# Patient Record
Sex: FEMALE | Race: WHITE | NOT HISPANIC OR LATINO | Employment: PART TIME | URBAN - NONMETROPOLITAN AREA
[De-identification: names, ages, dates, MRNs, and addresses within clinical notes are randomized per-mention and may not be internally consistent; named-entity substitution may affect disease eponyms.]

---
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
## 2021-03-18 ENCOUNTER — OFFICE VISIT (OUTPATIENT)
Dept: OBSTETRICS AND GYNECOLOGY | Facility: CLINIC | Age: 21
End: 2021-03-18
Payer: MEDICAID

## 2021-03-18 VITALS
SYSTOLIC BLOOD PRESSURE: 159 MMHG | WEIGHT: 231 LBS | DIASTOLIC BLOOD PRESSURE: 100 MMHG | BODY MASS INDEX: 36.26 KG/M2 | HEIGHT: 67 IN | HEART RATE: 81 BPM

## 2021-03-18 DIAGNOSIS — Z11.3 SCREENING EXAMINATION FOR VENEREAL DISEASE: ICD-10-CM

## 2021-03-18 DIAGNOSIS — R30.0 DYSURIA: Primary | ICD-10-CM

## 2021-03-18 LAB
BILIRUB SERPL-MCNC: ABNORMAL MG/DL
BLOOD URINE, POC: 5
CLARITY, POC UA: CLEAR
COLOR, POC UA: ABNORMAL
GLUCOSE UR QL STRIP: ABNORMAL
KETONES UR QL STRIP: ABNORMAL
LEUKOCYTE ESTERASE URINE, POC: 75
NITRITE, POC UA: ABNORMAL
PH, POC UA: 5.5
PROTEIN, POC: 30
SPECIFIC GRAVITY, POC UA: 1.02
UROBILINOGEN, POC UA: NORMAL

## 2021-03-18 PROCEDURE — 99213 OFFICE O/P EST LOW 20 MIN: CPT | Mod: S$PBB,,, | Performed by: OBSTETRICS & GYNECOLOGY

## 2021-03-18 PROCEDURE — 80074 ACUTE HEPATITIS PANEL: CPT | Performed by: OBSTETRICS & GYNECOLOGY

## 2021-03-18 PROCEDURE — 99999 PR PBB SHADOW E&M-EST. PATIENT-LVL III: ICD-10-PCS | Mod: PBBFAC,,, | Performed by: OBSTETRICS & GYNECOLOGY

## 2021-03-18 PROCEDURE — 87661 TRICHOMONAS VAGINALIS AMPLIF: CPT | Performed by: OBSTETRICS & GYNECOLOGY

## 2021-03-18 PROCEDURE — 86695 HERPES SIMPLEX TYPE 1 TEST: CPT | Performed by: OBSTETRICS & GYNECOLOGY

## 2021-03-18 PROCEDURE — 86696 HERPES SIMPLEX TYPE 2 TEST: CPT | Performed by: OBSTETRICS & GYNECOLOGY

## 2021-03-18 PROCEDURE — 86592 SYPHILIS TEST NON-TREP QUAL: CPT | Performed by: OBSTETRICS & GYNECOLOGY

## 2021-03-18 PROCEDURE — 99213 OFFICE O/P EST LOW 20 MIN: CPT | Mod: PBBFAC | Performed by: OBSTETRICS & GYNECOLOGY

## 2021-03-18 PROCEDURE — 81002 URINALYSIS NONAUTO W/O SCOPE: CPT | Mod: PBBFAC | Performed by: OBSTETRICS & GYNECOLOGY

## 2021-03-18 PROCEDURE — 99213 PR OFFICE/OUTPT VISIT, EST, LEVL III, 20-29 MIN: ICD-10-PCS | Mod: S$PBB,,, | Performed by: OBSTETRICS & GYNECOLOGY

## 2021-03-18 PROCEDURE — 86703 HIV-1/HIV-2 1 RESULT ANTBDY: CPT | Performed by: OBSTETRICS & GYNECOLOGY

## 2021-03-18 PROCEDURE — 99999 PR PBB SHADOW E&M-EST. PATIENT-LVL III: CPT | Mod: PBBFAC,,, | Performed by: OBSTETRICS & GYNECOLOGY

## 2021-03-18 RX ORDER — NITROFURANTOIN 25; 75 MG/1; MG/1
100 CAPSULE ORAL 2 TIMES DAILY
Qty: 14 CAPSULE | Refills: 0 | Status: SHIPPED | OUTPATIENT
Start: 2021-03-18 | End: 2021-03-25

## 2021-03-19 LAB
HAV IGM SERPL QL IA: NEGATIVE
HBV CORE IGM SERPL QL IA: NEGATIVE
HBV SURFACE AG SERPL QL IA: NEGATIVE
HCV AB SERPL QL IA: NEGATIVE
HIV 1+2 AB+HIV1 P24 AG SERPL QL IA: NEGATIVE
RPR SER QL: NORMAL

## 2021-03-23 LAB
HSV1 IGG SERPL QL IA: POSITIVE
HSV2 IGG SERPL QL IA: NEGATIVE

## 2021-03-23 RX ORDER — AZITHROMYCIN 500 MG/1
1000 TABLET, FILM COATED ORAL DAILY
Qty: 2 TABLET | Refills: 0 | Status: SHIPPED | OUTPATIENT
Start: 2021-03-23 | End: 2021-03-24

## 2021-04-01 LAB
CHLAMYDIA/N. GONORROHEAE AMP DNA: NORMAL
TRICHOMONAS VAGINALIS, DNA, URINE: NORMAL

## 2021-04-05 DIAGNOSIS — A74.9 CHLAMYDIA INFECTION: Primary | ICD-10-CM

## 2021-04-05 RX ORDER — AZITHROMYCIN 500 MG/1
1000 TABLET, FILM COATED ORAL ONCE
Qty: 2 TABLET | Refills: 0 | Status: SHIPPED | OUTPATIENT
Start: 2021-04-05 | End: 2021-04-05

## 2021-06-08 ENCOUNTER — HISTORICAL (OUTPATIENT)
Dept: ADMINISTRATIVE | Facility: HOSPITAL | Age: 21
End: 2021-06-08

## 2021-06-08 LAB — SARS-COV-2 RNA RESP QL NAA+PROBE: NEGATIVE

## 2021-09-27 ENCOUNTER — HISTORICAL (OUTPATIENT)
Dept: ADMINISTRATIVE | Facility: HOSPITAL | Age: 21
End: 2021-09-27

## 2021-09-27 LAB
BILIRUB SERPL-MCNC: NEGATIVE MG/DL
BLOOD URINE, POC: NEGATIVE
CLARITY, POC UA: CLEAR
COLOR, POC UA: YELLOW
GLUCOSE UR QL STRIP: NEGATIVE
HAV IGM SERPL QL IA: NONREACTIVE
HBV CORE IGM SERPL QL IA: NONREACTIVE
HBV SURFACE AG SERPL QL IA: NONREACTIVE
HCV AB SERPL QL IA: NONREACTIVE
HIV 1+2 AB+HIV1 P24 AG SERPL QL IA: NONREACTIVE
KETONES UR QL STRIP: NEGATIVE
LEUKOCYTE EST, POC UA: NORMAL
NITRITE, POC UA: NEGATIVE
PH, POC UA: 6.5
PROTEIN, POC: NEGATIVE
SPECIFIC GRAVITY, POC UA: 1.02
T PALLIDUM AB SER QL: NONREACTIVE
UROBILINOGEN, POC UA: NORMAL

## 2021-10-14 ENCOUNTER — HISTORICAL (OUTPATIENT)
Dept: ADMINISTRATIVE | Facility: HOSPITAL | Age: 21
End: 2021-10-14

## 2021-10-14 LAB — SARS-COV-2 RNA RESP QL NAA+PROBE: NEGATIVE

## 2021-11-18 LAB — POC BETA-HCG (QUAL): NEGATIVE

## 2021-12-22 ENCOUNTER — HISTORICAL (OUTPATIENT)
Dept: ADMINISTRATIVE | Facility: HOSPITAL | Age: 21
End: 2021-12-22

## 2021-12-22 LAB
SARS-COV-2 RNA RESP QL NAA+PROBE: POSITIVE
STREP A PCR (OHS): NOT DETECTED

## 2021-12-27 ENCOUNTER — HISTORICAL (OUTPATIENT)
Dept: ADMINISTRATIVE | Facility: HOSPITAL | Age: 21
End: 2021-12-27

## 2022-01-18 ENCOUNTER — OFFICE VISIT (OUTPATIENT)
Dept: OBSTETRICS AND GYNECOLOGY | Facility: CLINIC | Age: 22
End: 2022-01-18
Payer: MEDICAID

## 2022-01-18 VITALS
WEIGHT: 220 LBS | HEIGHT: 67 IN | SYSTOLIC BLOOD PRESSURE: 141 MMHG | BODY MASS INDEX: 34.53 KG/M2 | HEART RATE: 85 BPM | DIASTOLIC BLOOD PRESSURE: 74 MMHG

## 2022-01-18 DIAGNOSIS — Z32.02 PREGNANCY TEST NEGATIVE: ICD-10-CM

## 2022-01-18 DIAGNOSIS — Z30.09 GENERAL COUNSELING FOR PRESCRIPTION OF ORAL CONTRACEPTIVES: ICD-10-CM

## 2022-01-18 DIAGNOSIS — Z30.011 ENCOUNTER FOR INITIAL PRESCRIPTION OF CONTRACEPTIVE PILLS: Primary | ICD-10-CM

## 2022-01-18 PROCEDURE — 3008F PR BODY MASS INDEX (BMI) DOCUMENTED: ICD-10-PCS | Mod: CPTII,,, | Performed by: OBSTETRICS & GYNECOLOGY

## 2022-01-18 PROCEDURE — 3078F DIAST BP <80 MM HG: CPT | Mod: CPTII,,, | Performed by: OBSTETRICS & GYNECOLOGY

## 2022-01-18 PROCEDURE — 3077F PR MOST RECENT SYSTOLIC BLOOD PRESSURE >= 140 MM HG: ICD-10-PCS | Mod: CPTII,,, | Performed by: OBSTETRICS & GYNECOLOGY

## 2022-01-18 PROCEDURE — 81025 URINE PREGNANCY TEST: CPT | Mod: PBBFAC | Performed by: OBSTETRICS & GYNECOLOGY

## 2022-01-18 PROCEDURE — 99999 PR PBB SHADOW E&M-EST. PATIENT-LVL III: CPT | Mod: PBBFAC,,, | Performed by: OBSTETRICS & GYNECOLOGY

## 2022-01-18 PROCEDURE — 1159F PR MEDICATION LIST DOCUMENTED IN MEDICAL RECORD: ICD-10-PCS | Mod: CPTII,,, | Performed by: OBSTETRICS & GYNECOLOGY

## 2022-01-18 PROCEDURE — 1160F PR REVIEW ALL MEDS BY PRESCRIBER/CLIN PHARMACIST DOCUMENTED: ICD-10-PCS | Mod: CPTII,,, | Performed by: OBSTETRICS & GYNECOLOGY

## 2022-01-18 PROCEDURE — 99213 OFFICE O/P EST LOW 20 MIN: CPT | Mod: S$PBB,,, | Performed by: OBSTETRICS & GYNECOLOGY

## 2022-01-18 PROCEDURE — 3078F PR MOST RECENT DIASTOLIC BLOOD PRESSURE < 80 MM HG: ICD-10-PCS | Mod: CPTII,,, | Performed by: OBSTETRICS & GYNECOLOGY

## 2022-01-18 PROCEDURE — 99999 PR PBB SHADOW E&M-EST. PATIENT-LVL III: ICD-10-PCS | Mod: PBBFAC,,, | Performed by: OBSTETRICS & GYNECOLOGY

## 2022-01-18 PROCEDURE — 99213 OFFICE O/P EST LOW 20 MIN: CPT | Mod: PBBFAC | Performed by: OBSTETRICS & GYNECOLOGY

## 2022-01-18 PROCEDURE — 1160F RVW MEDS BY RX/DR IN RCRD: CPT | Mod: CPTII,,, | Performed by: OBSTETRICS & GYNECOLOGY

## 2022-01-18 PROCEDURE — 3077F SYST BP >= 140 MM HG: CPT | Mod: CPTII,,, | Performed by: OBSTETRICS & GYNECOLOGY

## 2022-01-18 PROCEDURE — 3008F BODY MASS INDEX DOCD: CPT | Mod: CPTII,,, | Performed by: OBSTETRICS & GYNECOLOGY

## 2022-01-18 PROCEDURE — 1159F MED LIST DOCD IN RCRD: CPT | Mod: CPTII,,, | Performed by: OBSTETRICS & GYNECOLOGY

## 2022-01-18 PROCEDURE — 99213 PR OFFICE/OUTPT VISIT, EST, LEVL III, 20-29 MIN: ICD-10-PCS | Mod: S$PBB,,, | Performed by: OBSTETRICS & GYNECOLOGY

## 2022-01-18 RX ORDER — METHYLPHENIDATE HYDROCHLORIDE 27 MG/1
27 TABLET ORAL EVERY MORNING
COMMUNITY
Start: 2022-01-06 | End: 2023-08-02

## 2022-01-18 RX ORDER — DROSPIRENONE AND ETHINYL ESTRADIOL 0.02-3(28)
1 KIT ORAL DAILY
Qty: 28 TABLET | Refills: 6 | Status: SHIPPED | OUTPATIENT
Start: 2022-01-18 | End: 2023-01-30

## 2022-01-18 RX ORDER — DROSPIRENONE AND ETHINYL ESTRADIOL 0.02-3(28)
1 KIT ORAL DAILY
Qty: 28 TABLET | Refills: 3 | Status: SHIPPED | OUTPATIENT
Start: 2022-01-18 | End: 2022-01-18 | Stop reason: SDUPTHER

## 2022-01-18 NOTE — PATIENT INSTRUCTIONS
"Patient Education       Choosing Birth Control   The Basics   Written by the doctors and editors at [facility]   What is birth control? -- Birth control is a term used to describe ways to prevent pregnancy. Another word for birth control is "contraception."  Different types of birth control include medicines, devices, and procedures. Some types need to be used every time you have sex. Other types can prevent pregnancy for long periods of time. Some types need a doctor's prescription, and others do not.  Which type of birth control should I choose? -- There are many different types of birth control, and this is a personal decision. Your doctor or nurse can work with you to choose the type that is right for you. To help you make a decision, think about:  · How well it prevents pregnancy - No birth control works 100 percent perfectly all the time, but some prevent pregnancy better than others.  · How often you have to use it - For example, if you choose to take birth control pills, you must take them every day. There are other types, like condoms, that you use only when you have sex.  · How easy it is to get - For some types of birth control, you need to see a doctor or nurse. You can get other types at the drug store, or at a health clinic like [facility].  · How easy it is to use  · Whether it has benefits besides preventing pregnancy - For example, some types of birth control help make your periods lighter or more regular, or reduce period cramps.  · Its side effects or downsides  · How much it costs  · If you think you might want to get pregnant in the future - Some types of birth control are permanent, meaning they prevent you from ever getting pregnant. Other types of birth control prevent pregnancy only for a limited amount of time. After that time, you can still get pregnant.  · How soon you might want to get pregnant in the future - Some types of birth control can be started and stopped quickly. Other " "types can prevent pregnancy for several years.   · Whether it protects you from infection - Condoms are the only form of birth control that can reduce your chance of getting certain sexually transmitted infections ("STIs").  What are the different types of birth control and how do they work? -- Different types of birth control prevent pregnancy in different ways (table 1). Some work better than others. But they are different in other ways, too.  The main types of birth control include:  · Permanent procedures - These make a person unable to get pregnant, or get a partner pregnant. They include tubal ligation (having your "tubes tied") and vasectomy.  · Long-acting methods - These are forms of birth control that can give you protection for years at a time. They include the IUDs and the implant.  · Hormonal birth control - These methods use hormones to prevent pregnancy. They include pills, injections, patches, and vaginal rings.  · Condoms - These are also called a "barrier" method. They prevent sperm from getting into the uterus and reaching an egg.  · "Pericoital" methods - This refers to birth control you use at the time of sex, such as diaphragms, sponges, and spermicides. Condoms are also a type of pericoital birth control.  What about natural forms of birth control? -- There are a few forms of "natural" birth control, which require no medicines or devices. They include:  · Withdrawal - This is when the male partner pulls out before ejaculating.  · Fertility awareness - This involves keeping track of your menstrual cycle so you can predict when you are most likely to get pregnant each month. Then, you can avoid sex during that time, or use some form of birth control then, such as condoms.  · Breastfeeding - Breastfeeding can decrease a person's ability to get pregnant. Some people use it as a form of birth control for the first few weeks after having a baby. But for it to work, breast milk should be the baby's " "only food. The medical term for using breastfeeding as birth control is "lactational amenorrhea method," or "TADEO." If you want to try this method, discuss it with your doctor or nurse.  These forms of birth control are less reliable than other methods. If you feel strongly that you do not want to get pregnant, or get your partner pregnant, you might want to consider other methods instead.  What if I have problems with my birth control? -- Let your doctor or nurse know if you have any side effects or problems with your birth control. Sometimes, side effects will go away after a few months. If they don't, you might want to switch to a different type. Your doctor or nurse can talk with you about your options.  All topics are updated as new evidence becomes available and our peer review process is complete.  This topic retrieved from [employer] on: Sep 21, 2021.  Topic 26981 Version 13.0  Release: 29.4.2 - C29.263  © 2021 UpToDate, Inc. and/or its affiliates. All rights reserved.  table 1: Types of birth control  Type  Methods included  Some information    Pericoital methods · Diaphragm   · Cervical cap   · Sponge   · Spermicides  "Pericoital" means methods that are used every time you have sex. The diaphragm, cervical cap, and sponge are used along with spermicide. Spermicide is a creams or gel that kills sperm before it can get to an egg. It can also be used alone, but is not as effective this way.   Barrier methods · Condoms (external and internal) Barrier methods block sperm from getting into the uterus and reaching an egg. Condoms are the only form of birth control that can also protect against infections you can get through sex.   Short-acting hormonal methods · Shot/injection   · Progestin-only pill   · Estrogen-progestin pill   · Patch   · Vaginal ring  These methods all use hormones to cause changes in the body that reduce the chance of pregnancy. The different options require different amounts of attention. For " "example, if you get the shot, you must see your doctor every 3 months. If you take pills, you must take a pill every day. If you use the patch or the ring, you must change it once a week.   Long-acting methods · Implantable carlos   · Intrauterine device (IUD) with progestin   · IUD with copper  The implantable carlos and the IUD with progestin both use hormones to prevent pregnancy. The IUD with copper releases copper to prevent pregnancy. These stay in the body and keep working for 3 to 10 years, depending on the type chosen.   Permanent methods · Vasectomy   · Tubal ligation (having your "tubes tied") These methods involve procedures or surgery and are permanent.   Graphic 52808 Version 5.0  Consumer Information Use and Disclaimer   This information is not specific medical advice and does not replace information you receive from your health care provider. This is only a brief summary of general information. It does NOT include all information about conditions, illnesses, injuries, tests, procedures, treatments, therapies, discharge instructions or life-style choices that may apply to you. You must talk with your health care provider for complete information about your health and treatment options. This information should not be used to decide whether or not to accept your health care provider's advice, instructions or recommendations. Only your health care provider has the knowledge and training to provide advice that is right for you. The use of this information is governed by the [employer] End User License Agreement, available at https://www.[employer]/en/solutions/[employer]angle/about/cate.The use of [employer] content is governed by the [employer] Terms of Use. ©2021 UpToDate, Inc. All rights reserved.  Copyright   © 2021 UpToDate, Inc. and/or its affiliates. All rights reserved.    "

## 2022-01-18 NOTE — PROGRESS NOTES
Subjective:    Patient ID: Maggi Hubbard is a 21 y.o. y.o. female    Chief Complaint:   Chief Complaint   Patient presents with    Contraception       History of Present Illness:  Maggi presents today to restart birth control pills.  Her last cycle was about 1 month ago.  She states her cycles have been regular.  She desires to resume birth control pills.  She stopped them previously due to having trouble remembering to take them every day.  She says that she feels she can do better now.  She is interested in resuming the same pills she took previously as she tolerated them well.    Review of Systems   Constitutional: Negative for chills, fatigue and fever.   Respiratory: Negative for shortness of breath.    Cardiovascular: Negative for chest pain.   Gastrointestinal: Negative for abdominal pain, constipation, diarrhea and nausea.   Genitourinary: Negative for bladder incontinence, dysuria, hot flashes, pelvic pain and vaginal bleeding.   Neurological: Negative for headaches.   Psychiatric/Behavioral: Negative for depression.         Objective:    Vital Signs:  Vitals:    01/18/22 1622   BP: (!) 141/74   Pulse: 85     Wt Readings from Last 1 Encounters:   01/18/22 99.8 kg (220 lb)     Body mass index is 34.46 kg/m².    Physical Exam:  General:  alert, no distress   Abdomen:  Soft, nontender   Extremities: No cyanosis, clubbing, edema     Use and side effects of oral contraceptives discussed and patient desires to proceed.  All questions answered    I spent a total of 20 minutes on the day of the visit.  This includes face to face time and non-face to face time preparing to see the patient (eg, review of tests), obtaining and/or reviewing separately obtained history, documenting clinical information in the electronic or other health record, independently interpreting results and communicating results to the patient/family/caregiver, or care coordinator.          Assessment:      1. Encounter for initial prescription  of contraceptive pills    2. Pregnancy test negative    3. General counseling for prescription of oral contraceptives          Plan:      Encounter for initial prescription of contraceptive pills  -     POCT urine pregnancy  -     Discontinue: drospirenone-ethinyl estradioL (GIANVI, 28,) 3-0.02 mg per tablet; Take 1 tablet by mouth once daily.  Dispense: 28 tablet; Refill: 3  -     drospirenone-ethinyl estradioL (GIANVI, 28,) 3-0.02 mg per tablet; Take 1 tablet by mouth once daily.  Dispense: 28 tablet; Refill: 6    Pregnancy test negative    General counseling for prescription of oral contraceptives    rtc 6 months for follow up       Ness Yanes MD, FACOG   01/18/2022 4:33 PM

## 2022-04-11 ENCOUNTER — HISTORICAL (OUTPATIENT)
Dept: ADMINISTRATIVE | Facility: HOSPITAL | Age: 22
End: 2022-04-11
Payer: MEDICAID

## 2022-04-28 VITALS
DIASTOLIC BLOOD PRESSURE: 75 MMHG | BODY MASS INDEX: 32.76 KG/M2 | HEIGHT: 67 IN | WEIGHT: 208.75 LBS | OXYGEN SATURATION: 98 % | SYSTOLIC BLOOD PRESSURE: 128 MMHG

## 2022-09-22 ENCOUNTER — HISTORICAL (OUTPATIENT)
Dept: ADMINISTRATIVE | Facility: HOSPITAL | Age: 22
End: 2022-09-22
Payer: MEDICAID

## 2022-09-24 ENCOUNTER — HOSPITAL ENCOUNTER (EMERGENCY)
Facility: HOSPITAL | Age: 22
Discharge: HOME | End: 2022-09-24
Attending: STUDENT IN AN ORGANIZED HEALTH CARE EDUCATION/TRAINING PROGRAM
Payer: MEDICAID

## 2022-09-24 VITALS
WEIGHT: 233.69 LBS | RESPIRATION RATE: 18 BRPM | HEART RATE: 100 BPM | HEIGHT: 67 IN | BODY MASS INDEX: 36.68 KG/M2 | DIASTOLIC BLOOD PRESSURE: 84 MMHG | SYSTOLIC BLOOD PRESSURE: 148 MMHG | OXYGEN SATURATION: 99 % | TEMPERATURE: 98 F

## 2022-09-24 DIAGNOSIS — S02.5XXA CLOSED FRACTURE OF TOOTH, INITIAL ENCOUNTER: ICD-10-CM

## 2022-09-24 DIAGNOSIS — S00.81XA ABRASION OF FACE, INITIAL ENCOUNTER: ICD-10-CM

## 2022-09-24 DIAGNOSIS — S09.93XA FACIAL INJURY, INITIAL ENCOUNTER: Primary | ICD-10-CM

## 2022-09-24 LAB
B-HCG UR QL: NEGATIVE
CTP QC/QA: YES

## 2022-09-24 PROCEDURE — 99285 EMERGENCY DEPT VISIT HI MDM: CPT | Mod: 25

## 2022-09-24 PROCEDURE — 96372 THER/PROPH/DIAG INJ SC/IM: CPT | Performed by: PHYSICIAN ASSISTANT

## 2022-09-24 PROCEDURE — 25000003 PHARM REV CODE 250: Performed by: PHYSICIAN ASSISTANT

## 2022-09-24 PROCEDURE — 63600175 PHARM REV CODE 636 W HCPCS: Performed by: PHYSICIAN ASSISTANT

## 2022-09-24 PROCEDURE — 90471 IMMUNIZATION ADMIN: CPT | Performed by: PHYSICIAN ASSISTANT

## 2022-09-24 PROCEDURE — 81025 URINE PREGNANCY TEST: CPT | Performed by: PHYSICIAN ASSISTANT

## 2022-09-24 PROCEDURE — 90715 TDAP VACCINE 7 YRS/> IM: CPT | Performed by: PHYSICIAN ASSISTANT

## 2022-09-24 RX ORDER — AMOXICILLIN AND CLAVULANATE POTASSIUM 875; 125 MG/1; MG/1
1 TABLET, FILM COATED ORAL EVERY 12 HOURS
Qty: 14 TABLET | Refills: 0 | Status: SHIPPED | OUTPATIENT
Start: 2022-09-24 | End: 2022-10-01

## 2022-09-24 RX ORDER — ONDANSETRON 4 MG/1
4 TABLET, ORALLY DISINTEGRATING ORAL
Status: DISCONTINUED | OUTPATIENT
Start: 2022-09-24 | End: 2022-09-24 | Stop reason: HOSPADM

## 2022-09-24 RX ORDER — KETOROLAC TROMETHAMINE 30 MG/ML
30 INJECTION, SOLUTION INTRAMUSCULAR; INTRAVENOUS
Status: COMPLETED | OUTPATIENT
Start: 2022-09-24 | End: 2022-09-24

## 2022-09-24 RX ORDER — HYDROCODONE BITARTRATE AND ACETAMINOPHEN 5; 325 MG/1; MG/1
1 TABLET ORAL EVERY 6 HOURS PRN
Qty: 12 TABLET | Refills: 0 | Status: SHIPPED | OUTPATIENT
Start: 2022-09-24 | End: 2023-07-28

## 2022-09-24 RX ADMIN — KETOROLAC TROMETHAMINE 30 MG: 30 INJECTION, SOLUTION INTRAMUSCULAR; INTRAVENOUS at 09:09

## 2022-09-24 RX ADMIN — TETANUS TOXOID, REDUCED DIPHTHERIA TOXOID AND ACELLULAR PERTUSSIS VACCINE, ADSORBED 0.5 ML: 5; 2.5; 8; 8; 2.5 SUSPENSION INTRAMUSCULAR at 06:09

## 2022-09-24 RX ADMIN — BACITRACIN ZINC, NEOMYCIN, POLYMYXIN B 1 EACH: 400; 3.5; 5 OINTMENT TOPICAL at 06:09

## 2022-09-24 NOTE — ED PROVIDER NOTES
Encounter Date: 9/24/2022       History     Chief Complaint   Patient presents with    Facial Injury     Fell forward and struck face on concrete floor last night. Denies LOC. Alcohol involved. Abrasion to nasal bridge; broken tooth causing pain; swollen lip.     Patient with pmhx of ADHD presents today c/o of generalized headache, facial pain, and neck pain since fall while intoxicated last night. Patient accidentally fell forward while walking and landed flat on her face. She was walking on concrete. Since then she has been having the above mentioned symptoms. She reports one episode of emesis last night and says she has been nauseated all day which she has attributed to drinking. She has also chipped her tooth and would like a referral to a dentist. Last tdap unknown. Denies LOC, use of anticoagulation, other associated injuries.     The history is provided by the patient. No  was used.   Review of patient's allergies indicates:  No Known Allergies  History reviewed. No pertinent past medical history.  History reviewed. No pertinent surgical history.  Family History   Family history unknown: Yes     Social History     Tobacco Use    Smoking status: Never    Smokeless tobacco: Never   Substance Use Topics    Alcohol use: Not Currently    Drug use: Never     Review of Systems   Constitutional:  Negative for chills and fever.   HENT:  Positive for dental problem.    Respiratory:  Negative for cough, chest tightness and shortness of breath.    Cardiovascular:  Negative for chest pain, palpitations and leg swelling.   Gastrointestinal:  Positive for nausea and vomiting. Negative for abdominal pain, constipation and diarrhea.   Genitourinary:  Negative for dysuria, flank pain and hematuria.   Musculoskeletal:  Negative for arthralgias and myalgias.   Skin:  Positive for wound.   Neurological:  Positive for headaches. Negative for dizziness, seizures, syncope, facial asymmetry and light-headedness.      Physical Exam     Initial Vitals [09/24/22 1638]   BP Pulse Resp Temp SpO2   (!) 152/89 100 16 98.2 °F (36.8 °C) 97 %      MAP       --         Physical Exam    Vitals reviewed.  Constitutional: She appears well-developed and well-nourished. She is not diaphoretic. No distress.   HENT:   Head: Normocephalic and atraumatic.   Mouth/Throat: Oropharynx is clear and moist. No oropharyngeal exudate.   Superficial abrasion noted to bridge of nose as well as philtrum. There is some ecchymosis surrounding the abrasion on the nose. No epistaxis. No obvious nasal deformity. Tooth #8 with fracture and exposed dentin. Tender to palpation. No periorbital ecchymosis. No gama sign.    Eyes: Conjunctivae and EOM are normal. Pupils are equal, round, and reactive to light. No scleral icterus.   Neck: Neck supple.   No cervical vertebral point tenderness. No step offs. Full AROM of cspine, but increased pain with movement.    Normal range of motion.  Cardiovascular:  Normal rate, regular rhythm, normal heart sounds and intact distal pulses.           Pulmonary/Chest: Breath sounds normal. No respiratory distress. She exhibits no tenderness.   Abdominal: Abdomen is soft. Bowel sounds are normal. She exhibits no distension. There is no abdominal tenderness. There is no rebound and no guarding.   Musculoskeletal:         General: No tenderness or edema. Normal range of motion.      Cervical back: Normal range of motion and neck supple.     Neurological: She is alert and oriented to person, place, and time. She has normal strength. No cranial nerve deficit or sensory deficit. GCS score is 15. GCS eye subscore is 4. GCS verbal subscore is 5. GCS motor subscore is 6.   Skin: Skin is warm and dry. Capillary refill takes less than 2 seconds. No rash noted.   Psychiatric: She has a normal mood and affect.       ED Course   Procedures  Labs Reviewed   POCT URINE PREGNANCY          Imaging Results              CT Head Without Contrast  (Final result)  Result time 09/25/22 09:42:15      Final result by Dagmar Cortes MD (09/25/22 09:42:15)                   Impression:    Impression:    1. No acute intracranial traumatic injury identified. Details and other findings as noted above.    I concur with the preliminary report      Electronically signed by: Dagmar Cortes  Date:    09/25/2022  Time:    09:42               Narrative:    EXAMINATION:  CT HEAD WITHOUT CONTRAST    Technique: CT of the head was performed without intravenous contrast with axial as well as coronal and sagittal images.    Comparison: None.    Dosage Information: Automated Exposure Control was utilized 920.38 mGy.cm.    Clinical history: Pt states she fell last night and hit her face; scrape to nose area.    Findings:    Hemorrhage: No acute intracranial hemorrhage is seen.    CSF spaces: The ventricles sulci and basal cisterns are within normal limits.    Brain parenchyma: Unremarkable with preservation of the grey white junction throughout.    Cerebellum: Unremarkable.    Vascular: Unremarkable.    Sella and skull base: The sella appears to be within normal limits for age.    Cerebellopontine angles: Within normal limits.    Herniation: None.    Intracranial calcifications: Incidental note is made of bilateral choroid plexus calcification. Incidental note is made of some pineal region calcification.    Calvarium: No acute linear or depressed skull fracture is seen.    Maxillofacial Structures: Maxillofacial findings discussed separately in the maxillofacial CT report.                        Preliminary result by Dagmar Cortes MD (09/24/22 19:35:41)                   Narrative:    START OF REPORT:  Technique: CT of the head was performed without intravenous contrast with axial as well as coronal and sagittal images.    Comparison: None.    Dosage Information: Automated Exposure Control was utilized 920.38 mGy.cm.    Clinical history: Pt states she fell last  night and hit her face; scrape to nose area.    Findings:  Hemorrhage: No acute intracranial hemorrhage is seen.  CSF spaces: The ventricles sulci and basal cisterns are within normal limits.  Brain parenchyma: Unremarkable with preservation of the grey white junction throughout.  Cerebellum: Unremarkable.  Vascular: Unremarkable.  Sella and skull base: The sella appears to be within normal limits for age.  Cerebellopontine angles: Within normal limits.  Herniation: None.  Intracranial calcifications: Incidental note is made of bilateral choroid plexus calcification. Incidental note is made of some pineal region calcification.  Calvarium: No acute linear or depressed skull fracture is seen.    Maxillofacial Structures: Maxillofacial findings discussed separately in the maxillofacial CT report.      Impression:  1. No acute intracranial traumatic injury identified. Details and other findings as noted above.                          Preliminary result by Donald Hogan MD (09/24/22 19:35:41)                   Narrative:    START OF REPORT:  Technique: CT of the head was performed without intravenous contrast with axial as well as coronal and sagittal images.    Comparison: None.    Dosage Information: Automated Exposure Control was utilized 920.38 mGy.cm.    Clinical history: Pt states she fell last night and hit her face; scrape to nose area.    Findings:  Hemorrhage: No acute intracranial hemorrhage is seen.  CSF spaces: The ventricles sulci and basal cisterns are within normal limits.  Brain parenchyma: Unremarkable with preservation of the grey white junction throughout.  Cerebellum: Unremarkable.  Vascular: Unremarkable.  Sella and skull base: The sella appears to be within normal limits for age.  Cerebellopontine angles: Within normal limits.  Herniation: None.  Intracranial calcifications: Incidental note is made of bilateral choroid plexus calcification. Incidental note is made of some pineal region  calcification.  Calvarium: No acute linear or depressed skull fracture is seen.    Maxillofacial Structures: Maxillofacial findings discussed separately in the maxillofacial CT report.      Impression:  1. No acute intracranial traumatic injury identified. Details and other findings as noted above.                                         CT Maxillofacial Without Contrast (Final result)  Result time 09/25/22 10:17:07      Final result by Dagmar Cortes MD (09/25/22 10:17:07)                   Impression:    Impression:    1. No acute maxillofacial fracture identified. Details and other findings as noted above.    I concur with the preliminary report      Electronically signed by: Dagmar Cortes  Date:    09/25/2022  Time:    10:17               Narrative:    EXAMINATION:  CT MAXILLOFACIAL WITHOUT CONTRAST    Technique: Maxillofacial CT was performed with intravenous contrast with axial as well as sagittal and coronal images.  Automatic exposure control (AEC) is utilized to reduce patient radiation exposure.    Comparison: None.    Clinical history: Pt states she fell last night and hit her face; scrape to nose area.    Findings:    Orbital soft tissues: The orbital soft tissues appear unremarkable.    Bones:    Orbital bony structures: The bilateral orbital bony structures are intact with no orbital fracture identified.    Mandible: The mandible appears unremarkable.    Maxilla: The maxilla appears unremarkable.    Zygoma: The zygomatic arches are intact.    TMJ: The mandibular condyles appear normally placed with respect to the mandibular fossa.    Nasal Bones: No displaced nasal bone fracture is seen.    Paranasal sinuses: Minimal mucosal densities are seen in the left maxillary sinus. This may reflect mild sinusitis. The rest of the paranasal sinuses appear clear.    Mastoid air cells: The visualized mastoid air cells appear clear.                        Preliminary result by Dagmar Cortes MD  (09/24/22 19:35:02)                   Narrative:    START OF REPORT:  Technique: Maxillofacial CT was performed with intravenous contrast with axial as well as sagittal and coronal images.    Comparison: None.    Clinical history: Pt states she fell last night and hit her face; scrape to nose area.    Findings:  Orbital soft tissues: The orbital soft tissues appear unremarkable.  Bones:  Orbital bony structures: The bilateral orbital bony structures are intact with no orbital fracture identified.  Mandible: The mandible appears unremarkable.  Maxilla: The maxilla appears unremarkable.  Zygoma: The zygomatic arches are intact.  TMJ: The mandibular condyles appear normally placed with respect to the mandibular fossa.  Nasal Bones: No displaced nasal bone fracture is seen.  Paranasal sinuses: Minimal mucosal densities are seen in the left maxillary sinus. This may reflect mild sinusitis. The rest of the paranasal sinuses appear clear.  Mastoid air cells: The visualized mastoid air cells appear clear.      Impression:  1. No acute maxillofacial fracture identified. Details and other findings as noted above.                          Preliminary result by Dagmar Cortes MD (09/24/22 19:35:02)                   Narrative:    START OF REPORT:  Technique: Maxillofacial CT was performed with intravenous contrast with axial as well as sagittal and coronal images.    Comparison: None.    Clinical history: Pt states she fell last night and hit her face; scrape to nose area.    Findings:  Orbital soft tissues: The orbital soft tissues appear unremarkable.  Bones:  Orbital bony structures: The bilateral orbital bony structures are intact with no orbital fracture identified.  Mandible: The mandible appears unremarkable.  Maxilla: The maxilla appears unremarkable.  Zygoma: The zygomatic arches are intact.  TMJ: The mandibular condyles appear normally placed with respect to the mandibular fossa.  Nasal Bones: No displaced  nasal bone fracture is seen.  Paranasal sinuses: Minimal mucosal densities are seen in the left maxillary sinus. This may reflect mild sinusitis. The rest of the paranasal sinuses appear clear.  Mastoid air cells: The visualized mastoid air cells appear clear.      Impression:  1. No acute maxillofacial fracture identified. Details and other findings as noted above.                                         CT Cervical Spine Without Contrast (Final result)  Result time 09/25/22 09:26:19      Final result by Dagmar Cortes MD (09/25/22 09:26:19)                   Impression:    Impression:    1. No acute cervical spine, fracture dislocation or subluxation is seen.    2. Details as noted above.      Electronically signed by: Dagmar Cortes  Date:    09/25/2022  Time:    09:26               Narrative:    EXAMINATION:  CT CERVICAL SPINE WITHOUT CONTRAST    Technique: CT of the cervical spine was performed without intravenous contrast with axial as well as sagittal and coronal images.    Comparison: None.    Dosage Information: Automated Exposure Control was utilized 443.92 mGy.cm.    Clinical history: Pt states she fell last night and hit her face; scrape to nose area.    Findings:    Lung apices: The visualized lung apices appear unremarkable.    Spine:    Spinal canal: The spinal canal appears unremarkable.    Mineralization: Within normal limits.    Rotation: No significant rotation is seen.    Scoliosis: No significant scoliosis is seen.    Listhesis: No significant listhesis is identified.    Lordosis: The cervical lordosis is maintained.    Intervertebral disc spaces: The intervertebral discs are preserved throughout.    Fractures: No acute cervical spine, fracture dislocation or subluxation is seen.    Miscellaneous:    Mastoid air cells: The visualized mastoid air cells appear clear.    Soft Tissues: Unremarkable.                        Preliminary result by Dagmar Cortes MD (09/24/22  19:33:15)                   Narrative:    START OF REPORT:  Technique: CT of the cervical spine was performed without intravenous contrast with axial as well as sagittal and coronal images.    Comparison: None.    Dosage Information: Automated Exposure Control was utilized 443.92 mGy.cm.    Clinical history: Pt states she fell last night and hit her face; scrape to nose area.    Findings:  Lung apices: The visualized lung apices appear unremarkable.  Spine:  Spinal canal: The spinal canal appears unremarkable.  Mineralization: Within normal limits.  Rotation: No significant rotation is seen.  Scoliosis: No significant scoliosis is seen.  Listhesis: No significant listhesis is identified.  Lordosis: The cervical lordosis is maintained.  Intervertebral disc spaces: The intervertebral discs are preserved throughout.  Fractures: No acute cervical spine, fracture dislocation or subluxation is seen.    Miscellaneous:  Mastoid air cells: The visualized mastoid air cells appear clear.  Soft Tissues: Unremarkable.      Impression:  1. No acute cervical spine, fracture dislocation or subluxation is seen.  2. Details as noted above.                          Preliminary result by Donald Hogan MD (09/24/22 19:33:15)                   Narrative:    START OF REPORT:  Technique: CT of the cervical spine was performed without intravenous contrast with axial as well as sagittal and coronal images.    Comparison: None.    Dosage Information: Automated Exposure Control was utilized 443.92 mGy.cm.    Clinical history: Pt states she fell last night and hit her face; scrape to nose area.    Findings:  Lung apices: The visualized lung apices appear unremarkable.  Spine:  Spinal canal: The spinal canal appears unremarkable.  Mineralization: Within normal limits.  Rotation: No significant rotation is seen.  Scoliosis: No significant scoliosis is seen.  Listhesis: No significant listhesis is identified.  Lordosis: The cervical lordosis is  maintained.  Intervertebral disc spaces: The intervertebral discs are preserved throughout.  Fractures: No acute cervical spine, fracture dislocation or subluxation is seen.    Miscellaneous:  Mastoid air cells: The visualized mastoid air cells appear clear.  Soft Tissues: Unremarkable.      Impression:  1. No acute cervical spine, fracture dislocation or subluxation is seen.  2. Details as noted above.                                         Medications   Tdap (BOOSTRIX) vaccine injection 0.5 mL (0.5 mLs Intramuscular Given 9/24/22 1851)   neomycin-bacitracnZn-polymyxnB packet (1 each Topical (Top) Given 9/24/22 1853)   ketorolac injection 30 mg (30 mg Intramuscular Given 9/24/22 2116)           APC / Resident Notes:   I was not physically present during the history, exam or disposition of this patient. I was available at all times for consultation. (Zmora)                   Clinical Impression:   Final diagnoses:  [S09.93XA] Facial injury, initial encounter (Primary)  [S02.5XXA] Closed fracture of tooth, initial encounter  [S00.81XA] Abrasion of face, initial encounter      ED Disposition Condition    Discharge Stable          ED Prescriptions       Medication Sig Dispense Start Date End Date Auth. Provider    amoxicillin-clavulanate 875-125mg (AUGMENTIN) 875-125 mg per tablet Take 1 tablet by mouth every 12 (twelve) hours. for 7 days 14 tablet 9/24/2022 10/1/2022 ROCK Frazier    HYDROcodone-acetaminophen (NORCO) 5-325 mg per tablet Take 1 tablet by mouth every 6 (six) hours as needed for Pain. 12 tablet 9/24/2022 -- ROCK Frazier          Follow-up Information       Follow up With Specialties Details Why Contact Info    [facility] Emergency [facility]  If symptoms worsen return to ED immediately [address] [place] [address] [phone]    Primary Care Provider within 1-2 days  Go in 1 day      Dentist of choice within 1-2 days  Schedule an  appointment as soon as possible for a visit                ROCK Frazier  09/24/22 2015       Phuc Padron MD  09/25/22 1185

## 2022-09-24 NOTE — Clinical Note
"Maggi "Florinda Hubbard was seen and treated in our emergency department on 9/24/2022.  She may return to school on 09/27/2022.      If you have any questions or concerns, please don't hesitate to call.      ZAHEER CANO"

## 2022-09-24 NOTE — Clinical Note
"Maggi "Florinda Hubbard was seen and treated in our emergency department on 9/24/2022.  She may return to school on 09/20/2022.      If you have any questions or concerns, please don't hesitate to call.      ZAHEER CANO"

## 2022-11-07 ENCOUNTER — OFFICE VISIT (OUTPATIENT)
Dept: URGENT CARE | Facility: CLINIC | Age: 22
End: 2022-11-07
Payer: MEDICAID

## 2022-11-07 VITALS
RESPIRATION RATE: 20 BRPM | SYSTOLIC BLOOD PRESSURE: 111 MMHG | BODY MASS INDEX: 37.51 KG/M2 | DIASTOLIC BLOOD PRESSURE: 74 MMHG | HEART RATE: 67 BPM | TEMPERATURE: 99 F | OXYGEN SATURATION: 100 % | WEIGHT: 239 LBS | HEIGHT: 67 IN

## 2022-11-07 DIAGNOSIS — J06.9 ACUTE URI: Primary | ICD-10-CM

## 2022-11-07 PROBLEM — E66.9 OBESITY: Status: ACTIVE | Noted: 2022-11-07

## 2022-11-07 LAB
FLUAV AG UPPER RESP QL IA.RAPID: NOT DETECTED
FLUBV AG UPPER RESP QL IA.RAPID: NOT DETECTED
SARS-COV-2 RNA RESP QL NAA+PROBE: NOT DETECTED
STREP A PCR (OHS): NOT DETECTED

## 2022-11-07 PROCEDURE — 99214 OFFICE O/P EST MOD 30 MIN: CPT | Mod: PBBFAC | Performed by: NURSE PRACTITIONER

## 2022-11-07 PROCEDURE — 0241U COVID/FLU A&B PCR: CPT | Performed by: NURSE PRACTITIONER

## 2022-11-07 PROCEDURE — 87651 STREP A DNA AMP PROBE: CPT | Performed by: NURSE PRACTITIONER

## 2022-11-07 PROCEDURE — 99213 OFFICE O/P EST LOW 20 MIN: CPT | Mod: S$PBB,,, | Performed by: NURSE PRACTITIONER

## 2022-11-07 PROCEDURE — 99213 PR OFFICE/OUTPT VISIT, EST, LEVL III, 20-29 MIN: ICD-10-PCS | Mod: S$PBB,,, | Performed by: NURSE PRACTITIONER

## 2022-11-07 RX ORDER — PROMETHAZINE HYDROCHLORIDE AND DEXTROMETHORPHAN HYDROBROMIDE 6.25; 15 MG/5ML; MG/5ML
5 SYRUP ORAL EVERY 6 HOURS PRN
Qty: 100 ML | Refills: 0 | Status: SHIPPED | OUTPATIENT
Start: 2022-11-07 | End: 2022-11-12

## 2022-11-07 RX ORDER — METHYLPHENIDATE HYDROCHLORIDE 36 MG/1
45 TABLET ORAL EVERY MORNING
COMMUNITY
Start: 2022-09-19

## 2022-11-07 RX ORDER — BENZONATATE 200 MG/1
200 CAPSULE ORAL 3 TIMES DAILY PRN
Qty: 30 CAPSULE | Refills: 0 | Status: SHIPPED | OUTPATIENT
Start: 2022-11-07 | End: 2022-11-17

## 2022-11-08 NOTE — PATIENT INSTRUCTIONS
Please read attached patient education for more information and guidance.     You have 4 tests pending in the hospital lab:  PCR COVID, FLU A/B  PCR Strep    All results will be available today.  Results will post to your [facility] over the next 1-2 days. Please check  your javy frequently for results and messages.   Nurse calls from our clinic can take 1-2 weeks even with abnormal results.   If something is urgent, we will call you today.     Stay home as long as you are symptomatic.    You can BECOME COVID+ up to 14 days after your last exposure to someone who has COVID. If you go in public and around other people, wear a surgical mask, not a cloth.     Cover your mouth with your shirt or inner elbow when you cough or sneeze.    OTC meds for symptoms, pain, fever, as desired, as directed on package labeling.

## 2022-11-08 NOTE — PROGRESS NOTES
"Subjective:       Patient ID: Maggi Hubbard is a 22 y.o. female.    Vitals:  height is 5' 7" (1.702 m) and weight is 108.4 kg (239 lb). Her oral temperature is 98.6 °F (37 °C). Her blood pressure is 111/74 and her pulse is 67. Her respiration is 20 and oxygen saturation is 100%.     Chief Complaint: Cough (Sore throat, cough, congestion, chills)    HPI as stated in CC. Migraine onset last Sunday (8 days ago) and other symptoms progressed throughout the week. Today worst symptom is throat pain.  ROS    Objective:      Physical Exam   Constitutional: She is oriented to person, place, and time. She appears ill. obesity  HENT:   Nose: Rhinorrhea and congestion present.   Mouth/Throat: Posterior oropharyngeal erythema present. No oropharyngeal exudate.   Eyes: Conjunctivae are normal.      Comments: Watery eyes/clear tearing   Neck:      Comments: SM nodes   Cardiovascular: Normal rate, regular rhythm and normal heart sounds.   Pulmonary/Chest: Effort normal and breath sounds normal.   Abdominal: Normal appearance.   Musculoskeletal:      Cervical back: She exhibits no tenderness.   Lymphadenopathy:     She has cervical adenopathy.   Neurological: She is alert and oriented to person, place, and time.   Skin: Skin is warm and dry.   Psychiatric: Her behavior is normal. Mood normal.   Vitals reviewed.      Assessment:       1. Acute URI            Plan:         Acute URI  -     COVID/FLU A&B PCR  -     Strep Group A by PCR    Other orders  -     benzonatate (TESSALON) 200 MG capsule; Take 1 capsule (200 mg total) by mouth 3 (three) times daily as needed.  Dispense: 30 capsule; Refill: 0  -     promethazine-dextromethorphan (PROMETHAZINE-DM) 6.25-15 mg/5 mL Syrp; Take 5 mLs by mouth every 6 (six) hours as needed (severe cough; reserve for evening/night use, will cause drowsiness. do not take before driving, working, drinking alcohol.).  Dispense: 100 mL; Refill: 0  Please read attached patient education for more information " and guidance.     You have 4 tests pending in the hospital lab:  PCR COVID, FLU A/B  PCR Strep    All results will be available today.  Results will post to your [facility] over the next 1-2 days. Please check  your javy frequently for results and messages.   Nurse calls from our clinic can take 1-2 weeks even with abnormal results.   If something is urgent, we will call you today.     Stay home as long as you are symptomatic.    You can BECOME COVID+ up to 14 days after your last exposure to someone who has COVID. If you go in public and around other people, wear a surgical mask, not a cloth.     Cover your mouth with your shirt or inner elbow when you cough or sneeze.    OTC meds for symptoms, pain, fever, as desired, as directed on package labeling.

## 2023-01-24 ENCOUNTER — OFFICE VISIT (OUTPATIENT)
Dept: URGENT CARE | Facility: CLINIC | Age: 23
End: 2023-01-24
Payer: MEDICAID

## 2023-01-24 VITALS
DIASTOLIC BLOOD PRESSURE: 78 MMHG | HEIGHT: 67 IN | WEIGHT: 223.81 LBS | RESPIRATION RATE: 18 BRPM | OXYGEN SATURATION: 98 % | TEMPERATURE: 98 F | BODY MASS INDEX: 35.13 KG/M2 | SYSTOLIC BLOOD PRESSURE: 113 MMHG | HEART RATE: 62 BPM

## 2023-01-24 DIAGNOSIS — N30.90 CYSTITIS: ICD-10-CM

## 2023-01-24 DIAGNOSIS — R30.0 DYSURIA: Primary | ICD-10-CM

## 2023-01-24 DIAGNOSIS — R35.0 URINARY FREQUENCY: ICD-10-CM

## 2023-01-24 LAB
BILIRUB UR QL STRIP: POSITIVE
GLUCOSE UR QL STRIP: NEGATIVE
KETONES UR QL STRIP: NEGATIVE
LEUKOCYTE ESTERASE UR QL STRIP: POSITIVE
PH, POC UA: 6
POC BLOOD, URINE: POSITIVE
POC NITRATES, URINE: NEGATIVE
PROT UR QL STRIP: POSITIVE
SP GR UR STRIP: 1.02 (ref 1–1.03)
UROBILINOGEN UR STRIP-ACNC: ABNORMAL (ref 0.1–1.1)

## 2023-01-24 PROCEDURE — 99214 OFFICE O/P EST MOD 30 MIN: CPT | Mod: PBBFAC | Performed by: FAMILY MEDICINE

## 2023-01-24 PROCEDURE — 99213 PR OFFICE/OUTPT VISIT, EST, LEVL III, 20-29 MIN: ICD-10-PCS | Mod: S$PBB,,, | Performed by: FAMILY MEDICINE

## 2023-01-24 PROCEDURE — 81003 URINALYSIS AUTO W/O SCOPE: CPT | Mod: PBBFAC | Performed by: FAMILY MEDICINE

## 2023-01-24 PROCEDURE — 99213 OFFICE O/P EST LOW 20 MIN: CPT | Mod: S$PBB,,, | Performed by: FAMILY MEDICINE

## 2023-01-24 PROCEDURE — 87088 URINE BACTERIA CULTURE: CPT | Performed by: FAMILY MEDICINE

## 2023-01-24 RX ORDER — NITROFURANTOIN (MACROCRYSTALS) 100 MG/1
100 CAPSULE ORAL EVERY 12 HOURS
Qty: 14 CAPSULE | Refills: 0 | Status: SHIPPED | OUTPATIENT
Start: 2023-01-24 | End: 2023-01-31

## 2023-01-24 NOTE — LETTER
January 24, 2023      [facility]  [address] [address]  Phone: [phone]       Patient: Maggi Hubbard   YOB: 2000  Date of Visit: 01/24/2023    To Whom It May Concern:    Kisha Hubbard  was at [facility] on 01/24/2023. The patient may return to work/school on JAN 25 2023 with no restrictions. If you have any questions or concerns, or if I can be of further assistance, please do not hesitate to contact me.    Sincerely,    NICOLÁS CHEATHAM MD

## 2023-01-24 NOTE — PROGRESS NOTES
"Subjective:       Patient ID: Maggi Hubbard is a 22 y.o. female.    Vitals:  height is 5' 7" (1.702 m) and weight is 101.5 kg (223 lb 12.8 oz). Her oral temperature is 98.2 °F (36.8 °C). Her blood pressure is 113/78 and her pulse is 62. Her respiration is 18 and oxygen saturation is 98%.     Chief Complaint: Urinary Frequency (X4days. Reports blood tinged urine) and Dysuria (X4days. Reports blood tinged urine)    Patient with 2 days of dysuria, faint gross hematuria.  No abdominal or back pain, no flank pain.  No fever.  LMP 1/12.  Has had UTIs in the past.    Urinary Frequency   Associated symptoms include frequency. Pertinent negatives include no discharge, possible pregnancy, sweats, vomiting or rash.   Dysuria   Associated symptoms include frequency. Pertinent negatives include no discharge, possible pregnancy, sweats, vomiting or rash.     Gastrointestinal:  Negative for vomiting.   Genitourinary:  Positive for dysuria and frequency.   Skin:  Negative for rash.     Objective:      Physical Exam   Constitutional:  Non-toxic appearance. She does not appear ill. No distress.   Neck: Neck supple.   Abdominal: Normal appearance. She exhibits no distension. flat abdomen There is no abdominal tenderness. There is no rebound, no guarding, no left CVA tenderness and no right CVA tenderness.   Neurological: no focal deficit. She is alert.   Skin: Skin is warm, dry and not diaphoretic.   Psychiatric: Her behavior is normal. Mood normal.   Nursing note and vitals reviewed.      Results for orders placed or performed in visit on 01/24/23   POCT Urinalysis, Dipstick, Automated, W/O Scope   Result Value Ref Range    POC Blood, Urine Positive (A) Negative    POC Bilirubin, Urine Positive (A) Negative    POC Urobilinogen, Urine 0.2 E.U./dL 0.1 - 1.1    POC Ketones, Urine Negative Negative    POC Protein, Urine Positive (A) Negative    POC Nitrates, Urine Negative Negative    POC Glucose, Urine Negative Negative    pH, UA 6.0     " POC Specific Gravity, Urine 1.020 1.003 - 1.029    POC Leukocytes, Urine Positive (A) Negative       Assessment:       1. Dysuria    2. Urinary frequency    3. Cystitis            Plan:         Dysuria  -     POCT Urinalysis, Dipstick, Automated, W/O Scope    Urinary frequency  -     POCT Urinalysis, Dipstick, Automated, W/O Scope    Cystitis  -     Urine culture  -     nitrofurantoin (MACRODANTIN) 100 MG capsule; Take 1 capsule (100 mg total) by mouth every 12 (twelve) hours. for 7 days  Dispense: 14 capsule; Refill: 0         Take medication as prescribed.  Increase your fluids and get plenty of rest.  If a urine culture was done, we will notify you if it indicates the need to change antibiotics.  Please follow instructions on patient education material.  Return to urgent care in 2 to 3 days if symptoms are not improving, immediately if you develop any new or worsening symptoms.

## 2023-01-26 LAB — BACTERIA UR CULT: NORMAL

## 2023-01-29 ENCOUNTER — OFFICE VISIT (OUTPATIENT)
Dept: URGENT CARE | Facility: CLINIC | Age: 23
End: 2023-01-29
Payer: MEDICAID

## 2023-01-29 VITALS
HEART RATE: 94 BPM | RESPIRATION RATE: 18 BRPM | HEIGHT: 68 IN | BODY MASS INDEX: 32.94 KG/M2 | WEIGHT: 217.38 LBS | SYSTOLIC BLOOD PRESSURE: 117 MMHG | DIASTOLIC BLOOD PRESSURE: 77 MMHG | OXYGEN SATURATION: 97 % | TEMPERATURE: 98 F

## 2023-01-29 DIAGNOSIS — J02.9 SORE THROAT: ICD-10-CM

## 2023-01-29 DIAGNOSIS — R05.9 COUGH, UNSPECIFIED TYPE: Primary | ICD-10-CM

## 2023-01-29 LAB
CTP QC/QA: YES
CTP QC/QA: YES
POC MOLECULAR INFLUENZA A AGN: NEGATIVE
POC MOLECULAR INFLUENZA B AGN: NEGATIVE
SARS-COV-2 RDRP RESP QL NAA+PROBE: NEGATIVE
STREP A PCR (OHS): NOT DETECTED

## 2023-01-29 PROCEDURE — 87502 INFLUENZA DNA AMP PROBE: CPT | Mod: PBBFAC | Performed by: FAMILY MEDICINE

## 2023-01-29 PROCEDURE — 87651 STREP A DNA AMP PROBE: CPT | Performed by: FAMILY MEDICINE

## 2023-01-29 PROCEDURE — 99214 OFFICE O/P EST MOD 30 MIN: CPT | Mod: PBBFAC | Performed by: FAMILY MEDICINE

## 2023-01-29 PROCEDURE — 99213 PR OFFICE/OUTPT VISIT, EST, LEVL III, 20-29 MIN: ICD-10-PCS | Mod: S$PBB,,, | Performed by: FAMILY MEDICINE

## 2023-01-29 PROCEDURE — 99213 OFFICE O/P EST LOW 20 MIN: CPT | Mod: S$PBB,,, | Performed by: FAMILY MEDICINE

## 2023-01-29 PROCEDURE — 87635 SARS-COV-2 COVID-19 AMP PRB: CPT | Mod: PBBFAC | Performed by: FAMILY MEDICINE

## 2023-01-29 NOTE — LETTER
January 29, 2023      [facility]  [facility] [facility] [address]  Phone: [phone]       Patient: Maggi Hubbard   YOB: 2000  Date of Visit: 01/29/2023    To Whom It May Concern:    Kisha Hubbard  was at [facility] on 01/29/2023. The patient may return to work/school on FEB 1 2023 with no restrictions. If you have any questions or concerns, or if I can be of further assistance, please do not hesitate to contact me.    Sincerely,    NICOLÁS CHEATHAM MD

## 2023-01-29 NOTE — PROGRESS NOTES
"Subjective:       Patient ID: Maggi Hubbard is a 22 y.o. female.    Vitals:  height is 5' 8.11" (1.73 m) and weight is 98.6 kg (217 lb 6 oz). Her temperature is 98.2 °F (36.8 °C). Her blood pressure is 117/77 and her pulse is 94. Her respiration is 18 and oxygen saturation is 97%.     Chief Complaint: Headache, Cough (X 2days), Sore Throat, and Referral (REQUESTS PCP)    2 days of mild headache with no neuro symptoms, mild cough, sore throat with no difficulty swallowing.    Headache   Associated symptoms include coughing and a sore throat. Pertinent negatives include no neck pain, sinus pressure or vomiting.   Cough  Associated symptoms include headaches and a sore throat.   Sore Throat   Associated symptoms include coughing and headaches. Pertinent negatives include no neck pain or vomiting.     HENT:  Positive for sore throat. Negative for sinus pressure.    Neck: Negative for neck pain.   Respiratory:  Positive for cough.    Gastrointestinal:  Negative for vomiting.   Neurological:  Positive for headaches.     Objective:      Physical Exam   Constitutional: She appears well-developed.  Non-toxic appearance. She does not appear ill. No distress.   HENT:   Head: Atraumatic.   Nose: No purulent discharge. Right sinus exhibits no maxillary sinus tenderness and no frontal sinus tenderness. Left sinus exhibits no maxillary sinus tenderness and no frontal sinus tenderness.   Mouth/Throat: Posterior oropharyngeal erythema present. No oropharyngeal exudate.   Eyes: Right eye exhibits no discharge. Left eye exhibits no discharge. Extraocular movement intact   Neck: Neck supple.   Cardiovascular: Regular rhythm.   Pulmonary/Chest: Effort normal and breath sounds normal. No respiratory distress. She has no wheezes. She has no rales.   Lymphadenopathy:     She has no cervical adenopathy.   Neurological: She is alert.   Skin: Skin is warm, dry and not diaphoretic.   Psychiatric: Her behavior is normal.   Nursing note and " vitals reviewed.      Results for orders placed or performed in visit on 01/29/23   POCT COVID-19 Rapid Screening   Result Value Ref Range    POC Rapid COVID Negative Negative     Acceptable Yes    POCT Influenza A/B Molecular   Result Value Ref Range    POC Molecular Influenza A Ag Negative Negative, Not Reported    POC Molecular Influenza B Ag Negative Negative, Not Reported     Acceptable Yes        Assessment:       1. Cough, unspecified type    2. Sore throat            Plan:         Cough, unspecified type  -     POCT COVID-19 Rapid Screening  -     POCT Influenza A/B Molecular    Sore throat  -     Strep Group A by PCR  -     Ambulatory referral/consult to Family Practice         Will notify if strep PCR is positive and treat accordingly.  Consider repeat COVID testing if symptoms worsen.  Use over-the-counter medications for symptom management.  Please follow Covid 19 precautions. Return to urgent care if current symptoms are not improving in 2-3 days, immediately if any new or worsening symptoms develop.  Refer to establish PCP as requested.

## 2023-01-30 ENCOUNTER — HOSPITAL ENCOUNTER (EMERGENCY)
Facility: HOSPITAL | Age: 23
Discharge: HOME | End: 2023-01-30
Attending: STUDENT IN AN ORGANIZED HEALTH CARE EDUCATION/TRAINING PROGRAM
Payer: MEDICAID

## 2023-01-30 VITALS
BODY MASS INDEX: 32.91 KG/M2 | RESPIRATION RATE: 18 BRPM | HEART RATE: 88 BPM | DIASTOLIC BLOOD PRESSURE: 75 MMHG | OXYGEN SATURATION: 99 % | WEIGHT: 217.13 LBS | HEIGHT: 68 IN | TEMPERATURE: 100 F | SYSTOLIC BLOOD PRESSURE: 118 MMHG

## 2023-01-30 DIAGNOSIS — J36 PERITONSILLAR ABSCESS: Primary | ICD-10-CM

## 2023-01-30 LAB
ALBUMIN SERPL-MCNC: 3.7 G/DL (ref 3.5–5)
ALBUMIN/GLOB SERPL: 0.9 RATIO (ref 1.1–2)
ALP SERPL-CCNC: 67 UNIT/L (ref 40–150)
ALT SERPL-CCNC: 12 UNIT/L (ref 0–55)
AST SERPL-CCNC: 16 UNIT/L (ref 5–34)
B-HCG UR QL: NEGATIVE
BASOPHILS # BLD AUTO: 0.04 X10(3)/MCL (ref 0–0.2)
BASOPHILS NFR BLD AUTO: 0.5 %
BILIRUBIN DIRECT+TOT PNL SERPL-MCNC: 0.6 MG/DL
BUN SERPL-MCNC: 6.2 MG/DL (ref 7–18.7)
CALCIUM SERPL-MCNC: 10.1 MG/DL (ref 8.4–10.2)
CHLORIDE SERPL-SCNC: 102 MMOL/L (ref 98–107)
CO2 SERPL-SCNC: 29 MMOL/L (ref 22–29)
CREAT SERPL-MCNC: 0.88 MG/DL (ref 0.55–1.02)
CTP QC/QA: YES
EOSINOPHIL # BLD AUTO: 0.04 X10(3)/MCL (ref 0–0.9)
EOSINOPHIL NFR BLD AUTO: 0.5 %
ERYTHROCYTE [DISTWIDTH] IN BLOOD BY AUTOMATED COUNT: 13 % (ref 11.5–17)
GFR SERPLBLD CREATININE-BSD FMLA CKD-EPI: >60 MLS/MIN/1.73/M2
GLOBULIN SER-MCNC: 3.9 GM/DL (ref 2.4–3.5)
GLUCOSE SERPL-MCNC: 102 MG/DL (ref 74–100)
HCT VFR BLD AUTO: 39.9 % (ref 37–47)
HGB BLD-MCNC: 12.6 GM/DL (ref 12–16)
IMM GRANULOCYTES # BLD AUTO: 0.03 X10(3)/MCL (ref 0–0.04)
IMM GRANULOCYTES NFR BLD AUTO: 0.4 %
LYMPHOCYTES # BLD AUTO: 0.98 X10(3)/MCL (ref 0.6–4.6)
LYMPHOCYTES NFR BLD AUTO: 11.5 %
MCH RBC QN AUTO: 28.7 PG
MCHC RBC AUTO-ENTMCNC: 31.6 MG/DL (ref 33–36)
MCV RBC AUTO: 90.9 FL (ref 80–94)
MONOCYTES # BLD AUTO: 0.89 X10(3)/MCL (ref 0.1–1.3)
MONOCYTES NFR BLD AUTO: 10.4 %
NEUTROPHILS # BLD AUTO: 6.55 X10(3)/MCL (ref 2.1–9.2)
NEUTROPHILS NFR BLD AUTO: 76.7 %
NRBC BLD AUTO-RTO: 0 %
PLATELET # BLD AUTO: 190 X10(3)/MCL (ref 130–400)
PMV BLD AUTO: 10.6 FL (ref 7.4–10.4)
POTASSIUM SERPL-SCNC: 3.6 MMOL/L (ref 3.5–5.1)
PROT SERPL-MCNC: 7.6 GM/DL (ref 6.4–8.3)
RBC # BLD AUTO: 4.39 X10(6)/MCL (ref 4.2–5.4)
SODIUM SERPL-SCNC: 140 MMOL/L (ref 136–145)
WBC # SPEC AUTO: 8.5 X10(3)/MCL (ref 4.5–11.5)

## 2023-01-30 PROCEDURE — 25000003 PHARM REV CODE 250: Performed by: PHYSICIAN ASSISTANT

## 2023-01-30 PROCEDURE — 25500020 PHARM REV CODE 255: Performed by: PHYSICIAN ASSISTANT

## 2023-01-30 PROCEDURE — 96375 TX/PRO/DX INJ NEW DRUG ADDON: CPT

## 2023-01-30 PROCEDURE — 99285 EMERGENCY DEPT VISIT HI MDM: CPT | Mod: 25

## 2023-01-30 PROCEDURE — 63600175 PHARM REV CODE 636 W HCPCS: Performed by: PHYSICIAN ASSISTANT

## 2023-01-30 PROCEDURE — 42999 UNLISTED PX PHRNX ADND/TNSL: CPT

## 2023-01-30 PROCEDURE — 85025 COMPLETE CBC W/AUTO DIFF WBC: CPT | Performed by: PHYSICIAN ASSISTANT

## 2023-01-30 PROCEDURE — 80053 COMPREHEN METABOLIC PANEL: CPT | Performed by: PHYSICIAN ASSISTANT

## 2023-01-30 PROCEDURE — 96365 THER/PROPH/DIAG IV INF INIT: CPT

## 2023-01-30 PROCEDURE — 81025 URINE PREGNANCY TEST: CPT | Performed by: PHYSICIAN ASSISTANT

## 2023-01-30 RX ORDER — LIDOCAINE HYDROCHLORIDE AND EPINEPHRINE 10; 10 MG/ML; UG/ML
2 INJECTION, SOLUTION INFILTRATION; PERINEURAL ONCE
Status: DISCONTINUED | OUTPATIENT
Start: 2023-01-30 | End: 2023-01-30

## 2023-01-30 RX ORDER — LIDOCAINE HCL/EPINEPHRINE/PF 2%-1:200K
2 VIAL (ML) INJECTION ONCE
Status: COMPLETED | OUTPATIENT
Start: 2023-01-30 | End: 2023-01-30

## 2023-01-30 RX ORDER — DEXAMETHASONE SODIUM PHOSPHATE 4 MG/ML
8 INJECTION, SOLUTION INTRA-ARTICULAR; INTRALESIONAL; INTRAMUSCULAR; INTRAVENOUS; SOFT TISSUE
Status: COMPLETED | OUTPATIENT
Start: 2023-01-30 | End: 2023-01-30

## 2023-01-30 RX ORDER — METHYLPREDNISOLONE 4 MG/1
TABLET ORAL
Qty: 21 EACH | Refills: 0 | Status: SHIPPED | OUTPATIENT
Start: 2023-01-30 | End: 2023-02-20

## 2023-01-30 RX ORDER — KETOROLAC TROMETHAMINE 30 MG/ML
15 INJECTION, SOLUTION INTRAMUSCULAR; INTRAVENOUS
Status: COMPLETED | OUTPATIENT
Start: 2023-01-30 | End: 2023-01-30

## 2023-01-30 RX ORDER — AMOXICILLIN AND CLAVULANATE POTASSIUM 875; 125 MG/1; MG/1
1 TABLET, FILM COATED ORAL 2 TIMES DAILY
Qty: 20 TABLET | Refills: 0 | Status: SHIPPED | OUTPATIENT
Start: 2023-01-30 | End: 2023-02-09

## 2023-01-30 RX ORDER — OXYCODONE HYDROCHLORIDE 5 MG/1
5 TABLET ORAL EVERY 6 HOURS PRN
Qty: 12 TABLET | Refills: 0 | Status: SHIPPED | OUTPATIENT
Start: 2023-01-30 | End: 2023-07-28

## 2023-01-30 RX ADMIN — DEXAMETHASONE SODIUM PHOSPHATE 8 MG: 4 INJECTION, SOLUTION INTRA-ARTICULAR; INTRALESIONAL; INTRAMUSCULAR; INTRAVENOUS; SOFT TISSUE at 07:01

## 2023-01-30 RX ADMIN — BENZOCAINE: 200 SPRAY DENTAL; ORAL; PERIODONTAL at 07:01

## 2023-01-30 RX ADMIN — AMPICILLIN SODIUM AND SULBACTAM SODIUM 3 G: 2; 1 INJECTION, POWDER, FOR SOLUTION INTRAMUSCULAR; INTRAVENOUS at 07:01

## 2023-01-30 RX ADMIN — IOHEXOL 100 ML: 350 INJECTION, SOLUTION INTRAVENOUS at 06:01

## 2023-01-30 RX ADMIN — LIDOCAINE HYDROCHLORIDE,EPINEPHRINE BITARTRATE 2 ML: 20; .005 INJECTION, SOLUTION EPIDURAL; INFILTRATION; INTRACAUDAL; PERINEURAL at 08:01

## 2023-01-30 RX ADMIN — KETOROLAC TROMETHAMINE 15 MG: 30 INJECTION, SOLUTION INTRAMUSCULAR; INTRAVENOUS at 04:01

## 2023-01-30 NOTE — Clinical Note
"Maggi "Florinda Hubbard was seen and treated in our emergency department on 1/30/2023.  She may return to school on 02/03/2023.      If you have any questions or concerns, please don't hesitate to call.      Shira Wilson PA-C"

## 2023-01-30 NOTE — Clinical Note
"Maggi "Florinda Hubbrad was seen and treated in our emergency department on 1/30/2023.  She may return to school on 02/03/2023.      If you have any questions or concerns, please don't hesitate to call.      Shira Wilson PA-C"

## 2023-01-30 NOTE — ED PROVIDER NOTES
Encounter Date: 1/30/2023       History     Chief Complaint   Patient presents with    throat pain     Sore throat becoming increasingly painful with swollen tonsils over the past 3 days. Tested negative for strep,covid, and flu yesterday.      21 YO WF in ER with complaints of worsening sore throat and voice change X 3 days. Was tested for Covid/Flu and strep throat yesterday and all tests came back negative. States she cannot swallow her spit due to the pain and has been spitting it out instead. Denies fever, chills, chest pain, SOB, abdominal pain, N/V/D, HA or dizziness. No other complaints.       Review of patient's allergies indicates:  No Known Allergies  Past Medical History:   Diagnosis Date    ADHD      No past surgical history on file.  Family History   Problem Relation Age of Onset    Mental illness Mother     Hypertension Father      Social History     Tobacco Use    Smoking status: Never    Smokeless tobacco: Never   Substance Use Topics    Alcohol use: Not Currently     Comment: once a month    Drug use: Never     Review of Systems   Constitutional:  Negative for chills and fever.   HENT:  Positive for sore throat, trouble swallowing (due to pain) and voice change. Negative for congestion.    Respiratory:  Negative for shortness of breath.    Cardiovascular:  Negative for chest pain.   Gastrointestinal:  Negative for abdominal pain, diarrhea, nausea and vomiting.   Genitourinary:  Negative for dysuria.   Musculoskeletal:  Negative for back pain.   Skin:  Negative for rash.   Neurological:  Negative for dizziness, weakness, light-headedness and headaches.   Hematological:  Does not bruise/bleed easily.   All other systems reviewed and are negative.    Physical Exam     Initial Vitals [01/30/23 1548]   BP Pulse Resp Temp SpO2   119/74 92 18 97.5 °F (36.4 °C) 98 %      MAP       --         Physical Exam    Nursing note and vitals reviewed.  Constitutional: She appears well-developed and well-nourished.  She is not diaphoretic. No distress.   HENT:   Head: Normocephalic and atraumatic.   Mouth/Throat: Mucous membranes are normal. Oropharyngeal exudate and posterior oropharyngeal erythema present.   Possible peritonsillar abscess on right   Eyes: Conjunctivae are normal.   Cardiovascular:  Normal rate and normal heart sounds.           Pulmonary/Chest: Breath sounds normal. She has no wheezes. She has no rhonchi.   Abdominal: Abdomen is soft.   Musculoskeletal:         General: Normal range of motion.     Neurological: She is alert and oriented to person, place, and time. She has normal strength.   Skin: Skin is warm and dry.   Psychiatric: She has a normal mood and affect.       ED Course   Procedures  Labs Reviewed   COMPREHENSIVE METABOLIC PANEL - Abnormal; Notable for the following components:       Result Value    Glucose Level 102 (*)     Blood Urea Nitrogen 6.2 (*)     Globulin 3.9 (*)     Albumin/Globulin Ratio 0.9 (*)     All other components within normal limits   CBC WITH DIFFERENTIAL - Abnormal; Notable for the following components:    MCHC 31.6 (*)     MPV 10.6 (*)     All other components within normal limits   CBC W/ AUTO DIFFERENTIAL    Narrative:     The following orders were created for panel order CBC auto differential.  Procedure                               Abnormality         Status                     ---------                               -----------         ------                     CBC with Differential[[id number]]        Abnormal            Final result                 Please view results for these tests on the individual orders.   EXTRA TUBES    Narrative:     The following orders were created for panel order EXTRA TUBES.  Procedure                               Abnormality         Status                     ---------                               -----------         ------                     Light Blue Top Hold[[id number]]                              In process                 Gold Top  Hold[[id number]]                                    In process                   Please view results for these tests on the individual orders.   LIGHT BLUE TOP HOLD   GOLD TOP HOLD   POCT URINE PREGNANCY          Imaging Results               CT Soft Tissue Neck With Contrast (Final result)  Result time 01/30/23 18:06:02      Final result by Orestes Eng MD (01/30/23 18:06:02)                   Narrative:    EXAMINATION  CT SOFT TISSUE NECK WITH CONTRAST    CLINICAL HISTORY  tonsillar swelling on right, suspect peritonsillar abscess;  worsening sore throat    TECHNIQUE  Post-contrast helical-acquisition CT images of the neck were obtained and multiplanar reformats accomplished by a CT technologist at a separate workstation, pushed to PACS for physician review.    CONTRAST  IV: Omnipaque 350, 100 mL    COMPARISON  None available at the time of initial interpretation.    FINDINGS  Images were reviewed in soft tissue, lung, and bone windows.    Exam quality: adequate for evaluation    Aerodigestive structures: No focal abnormality of the salivary glands or oral cavity. There is heterogeneous, edematous enlargement of the right parapharyngeal tissues.  Associated central mildly organized low-density is suggestive of developing abscess measuring 2.1 cm x 1.6 cm x 1.3 cm (series 2, image 35; series 4, image 36; series 6, image 46).  Mild mass effect upon the pharyngeal airway is present, with no occlusive component.  The remaining visualized aerodigestive structures are of unremarkable CT appearance.    Lymph nodes: Enlarged, heterogeneously enhancing right level 2 cervical chain nodes are present, overall reactive in appearance.  No other significant antonio enlargement is appreciated.  No necrotic adenopathy is identified.    Thyroid: Unremarkable.    Other findings: Regional vascular structures are unremarkable. Mediastinal components are normal in appearance.  Visualized lung fields are clear. The included  intracranial structures are without acute process or focal abnormality. The mastoid air cells are well-aerated bilaterally. No displaced fracture of the maxillofacial components or visualized spine.  Paranasal sinuses are clear.    IMPRESSION  1. Right peritonsillar abscess with associated mild, nonocclusive mass effect upon the adjacent airway  2. Reactive-appearing right cervical adenopathy.  ==========    This report was flagged in Epic as abnormal.    RADIATION DOSE  Automated tube current modulation, weight-based exposure dosing, and/or iterative reconstruction technique utilized to reach lowest reasonably achievable exposure rate.    DLP: 304 mGy*cm      Electronically signed by: Orestes Eng  Date:    01/30/2023  Time:    18:06                                     Medications   ketorolac injection 15 mg (15 mg Intravenous Given 1/30/23 1624)   iohexoL (OMNIPAQUE 350) injection 100 mL (100 mLs Intravenous Given 1/30/23 1800)   ampicillin-sulbactam (UNASYN) 3 g in sodium chloride 0.9 % 100 mL IVPB (MB+) (0 g Intravenous Stopped 1/30/23 2007)   dexAMETHasone injection 8 mg (8 mg Intravenous Given 1/30/23 1906)   benzocaine 20 % mouth spray ( Mouth/Throat Given 1/30/23 1906)   LIDOcaine-EPINEPHrine (PF) 2%-1:200,000 injection 2 mL (2 mLs Other Given 1/30/23 2030)           APC / Resident Notes:   I was not physically present during the history or exam of this patient. I was available at all times for consultation. (Zmora)        ED Course as of 01/31/23 0058   Mon Jan 30, 2023   1856 VSS, NAD, pt is non-toxic appearing, labs and imaging reviewed with pt, ENT consulted for peritonsillar abscess on right, pt in agreement with treatment plan, all questions answered, pt is currently stable [TT]   1857 Care transitioned to Shira Wilson PAC [TT]   2017 ENT performed I&D. Discussed case with them. PO challenge. Dispo pending if patient can tolerate liquids. [KD]   2110 Pt passed PO challenge. Stable for discharge  with medrol dose pack, Augmentin x 10 days. ENT will schedule follow up in clinic on Thursday. Return precautions discussed. Patient verbalized understanding. All questions answered.  [KD]      ED Course User Index  [KD] Shira Wilson PA-C  [TT] ROCK Chowdhury                 Clinical Impression:   Final diagnoses:  [J36] Peritonsillar abscess (Primary)        ED Disposition Condition    Discharge Stable          ED Prescriptions       Medication Sig Dispense Start Date End Date Auth. Provider    methylPREDNISolone (MEDROL DOSEPACK) 4 mg tablet use as directed 21 each 1/30/2023 2/20/2023 Shira Wilson PA-C    amoxicillin-clavulanate 875-125mg (AUGMENTIN) 875-125 mg per tablet Take 1 tablet by mouth 2 (two) times daily. for 10 days 20 tablet 1/30/2023 2/9/2023 Shira Wilson PA-C    oxyCODONE (ROXICODONE) 5 MG immediate release tablet Take 1 tablet (5 mg total) by mouth every 6 (six) hours as needed for Pain. 12 tablet 1/30/2023 -- Shira Wilson PA-C          Follow-up Information       Follow up With Specialties Details Why Contact Info Additional Information    [facility] Emergency Medicine  If symptoms worsen [address] [id number] [phone]     [facility], Entrance 6 Otolaryngology On 2/2/2023  [address] [id number] [phone] [facility],  Entrance #6 Please sign with the  when you arrive.             Shira Wilson PA-C  01/30/23 2111       Phuc Padron MD  01/31/23 0059

## 2023-01-31 NOTE — H&P
[facility]  Otolaryngology-Head & Neck Surgery    History and Physical Note    Maggi Hubbard  [id number]  2000    CC: sore throat, odynophagia    HPI: aMggi Hubbard is a 22 y.o. female that presents with a 3 day history of sore throat, progressive dysphagia and odynophagia, muffled voice. She denies fevers or trouble breathing. Minimal inability to open mouth. Has noticed tender right neck LAD. She presented to urgent care yesterday. Antibiotic naive. Has hx of 2 episodes of strep throat per year every year. No prior peritonsillar abscess. No PMH or surgical hx. No allergies she is aware of.    ROS:   General: Negative except per HPI  Skin: Denies rash, ulcer, or lesion.  Eyes: Denies vision changes or diplopia.  Ears: Negative except per HPI  Nose: Negative except per HPI  Throat/mouth: Negative except per HPI  Cardiovascular: Negative except per HPI  Respiratory: Negative except per HPI  Neck: Negative except per HPI  Endocrine: Negative except per HPI  Neurologic: Negative except per HPI    Review of patient's allergies indicates:  No Known Allergies    Past Medical History:   Diagnosis Date    ADHD        No past surgical history on file.    Social History     Socioeconomic History    Marital status: Single   Tobacco Use    Smoking status: Never    Smokeless tobacco: Never   Substance and Sexual Activity    Alcohol use: Not Currently     Comment: once a month    Drug use: Never    Sexual activity: Yes     Partners: Male     Birth control/protection: OCP       Family History   Problem Relation Age of Onset    Mental illness Mother     Hypertension Father        PHYSICAL EXAM:  Vitals:    01/30/23 1548   BP: 119/74   Pulse: 92   Resp: 18   Temp: 97.5 °F (36.4 °C)       General Appearance: well nourished, well-developed, alert, oriented, in no acute distress, muffled voice  Head/Face: NC, AT  Eyes: PEERLA, EOMI, normal conjunctiva  Ears: Hears well at normal conversation  volume  Externally normal  Nose: septum midline, externally normal, no rhinorrhea or epistaxis  OC/OP: dentition moderate, on significant trismus, 3+ cm interincisor distance. Tongue WNL fully mobile. Fullness of the right palatoglossal fold compared to left, TTP but no significant fluctuance. Right tonsil with dark gray exudate, 2+, slight uvular deviation to the left. Left soft palate WNL. Left tonsil 1+  Nasopharynx, Hypopharynx, and Larynx: indirect visualization attempted, limited view due to patient intolerance  Neck: soft, non-tender, palpable and tender right cervical LAD  Neuro: CN II - XII intact  Psychiatric: oriented to time, place and person, no depression, anxiety or agitation    Procedure: Aspiration of right peritonsillar abscess/phlegmon  We discussed the R/B/I of aspiration of right PTA. Informed consent was obtained.  Surgeon: David Rock MD  Findings: exudate and 2+ right tonsil, fullness of left palatoglossal fold, unable to express purulence  Procedure in detail:  Patient was seated upright in chair. Throat was sprayed with Hurricane spray. Next, 5 cc of local anesthesia was injected in the right peritonsillar mucosa. Following, 18 gauge needle was used to aspirate 1-1.5 cm deep in the right peritonsillar space in the area of fullness. Unfortunately after numerous attempts, could not express any purulence from the region. Bleeding was minimal. Patient tolerated the procedure very well.      PERTINENT DATA:  CT soft tissue neck independently reviewed  Small right sided superior peritonsillar phlegmon vs abscess, about 1.5 cm. Reactive appearing right sided jugular chain LAD.    ASSESSMENT:  Maggi Hubbard is a 22 y.o. female with right sided small early PTA vs phlegmon. Attempted numerous aspirations, but unable to express purulence  Unable to pass oral trial in ED    PLAN:  --admit to obs  --unasyn, decadronx3, mIVF  -regular diet, NPO mn  -SCDs  -re-evaluate in am    Case discussed with   Darell Rock MD  [facility] Otolaryngology  8:19 PM 01/30/2023

## 2023-01-31 NOTE — CONSULTS
[facility]  Otolaryngology-Head & Neck Surgery    Consult Note    Maggi Hubbard  [id number]  2000    CC: sore throat, odynophagia    HPI: Maggi Hubbard is a 22 y.o. female that presents with a 3 day history of sore throat, progressive dysphagia and odynophagia, muffled voice. She denies fevers or trouble breathing. Minimal inability to open mouth. Has noticed tender right neck LAD. She presented to urgent care yesterday. Antibiotic naive. Has hx of 2 episodes of strep throat per year every year. No prior peritonsillar abscess. No PMH or surgical hx. No allergies she is aware of.    ROS:   General: Negative except per HPI  Skin: Denies rash, ulcer, or lesion.  Eyes: Denies vision changes or diplopia.  Ears: Negative except per HPI  Nose: Negative except per HPI  Throat/mouth: Negative except per HPI  Cardiovascular: Negative except per HPI  Respiratory: Negative except per HPI  Neck: Negative except per HPI  Endocrine: Negative except per HPI  Neurologic: Negative except per HPI    Review of patient's allergies indicates:  No Known Allergies    Past Medical History:   Diagnosis Date    ADHD        No past surgical history on file.    Social History     Socioeconomic History    Marital status: Single   Tobacco Use    Smoking status: Never    Smokeless tobacco: Never   Substance and Sexual Activity    Alcohol use: Not Currently     Comment: once a month    Drug use: Never    Sexual activity: Yes     Partners: Male     Birth control/protection: OCP       Family History   Problem Relation Age of Onset    Mental illness Mother     Hypertension Father        PHYSICAL EXAM:  Vitals:    01/30/23 1548   BP: 119/74   Pulse: 92   Resp: 18   Temp: 97.5 °F (36.4 °C)       General Appearance: well nourished, well-developed, alert, oriented, in no acute distress, muffled voice  Head/Face: NC, AT  Eyes: PEERLA, EOMI, normal conjunctiva  Ears: Hears well at normal conversation volume  Externally  normal  Nose: septum midline, externally normal, no rhinorrhea or epistaxis  OC/OP: dentition moderate, on significant trismus, 3+ cm interincisor distance. Tongue WNL fully mobile. Fullness of the right palatoglossal fold compared to left, TTP but no significant fluctuance. Right tonsil with dark gray exudate, 2+, slight uvular deviation to the left. Left soft palate WNL. Left tonsil 1+  Nasopharynx, Hypopharynx, and Larynx: indirect visualization attempted, limited view due to patient intolerance  Neck: soft, non-tender, palpable and tender right cervical LAD  Neuro: CN II - XII intact  Psychiatric: oriented to time, place and person, no depression, anxiety or agitation    Procedure: Aspiration of right peritonsillar abscess/phlegmon  We discussed the R/B/I of aspiration of right PTA. Informed consent was obtained.  Surgeon: David Rock MD  Findings: exudate and 2+ right tonsil, fullness of left palatoglossal fold, unable to express purulence  Procedure in detail:  Patient was seated upright in chair. Throat was sprayed with Hurricane spray. Next, 5 cc of local anesthesia was injected in the right peritonsillar mucosa. Following, 18 gauge needle was used to aspirate 1-1.5 cm deep in the right peritonsillar space in the area of fullness. Unfortunately after numerous attempts, could not express any purulence from the region. Bleeding was minimal. Patient tolerated the procedure very well.      PERTINENT DATA:  CT soft tissue neck independently reviewed  Small right sided superior peritonsillar phlegmon vs abscess, about 1.5 cm. Reactive appearing right sided jugular chain LAD.    ASSESSMENT:  Maggi Hubbard is a 22 y.o. female with right sided small early PTA vs phlegmon. Attempted numerous aspirations, but unable to express purulence    PLAN:  --will perform PO trial  -- if passes PO trial, will allow patient to go home for treatment consisting of augmentin, pain control, and medrol dose pack.  -- if unable to  tolerate PO, will continue Unasyn and Decadron (1 dose each in ER) and admit for observation and mIVF.   -- Discussed both of these options in detail with patient and she agrees      David Rock MD  [facility]  8:19 PM 01/30/2023

## 2023-02-02 ENCOUNTER — HOSPITAL ENCOUNTER (EMERGENCY)
Facility: HOSPITAL | Age: 23
Discharge: HOME | End: 2023-02-02
Attending: FAMILY MEDICINE
Payer: MEDICAID

## 2023-02-02 VITALS
BODY MASS INDEX: 33.41 KG/M2 | OXYGEN SATURATION: 100 % | RESPIRATION RATE: 18 BRPM | HEART RATE: 59 BPM | HEIGHT: 68 IN | DIASTOLIC BLOOD PRESSURE: 83 MMHG | TEMPERATURE: 98 F | SYSTOLIC BLOOD PRESSURE: 130 MMHG | WEIGHT: 220.44 LBS

## 2023-02-02 DIAGNOSIS — J36 PERITONSILLAR ABSCESS: Primary | ICD-10-CM

## 2023-02-02 PROCEDURE — 99281 EMR DPT VST MAYX REQ PHY/QHP: CPT

## 2023-02-02 NOTE — Clinical Note
"Maggi "Florinda Hubbard was seen and treated in our emergency department on 2/2/2023.  She may return to school on 02/04/2023.      If you have any questions or concerns, please don't hesitate to call.      BISHOP Levy RN"

## 2023-02-02 NOTE — ED PROVIDER NOTES
Encounter Date: 2/2/2023       History     Chief Complaint   Patient presents with    Oral Swelling     FU PERITONSILLAR ABSCESS. SOME IMPROVEMENT REPORTED. TAKING RX MEDS.  NO MEDS.  STATES DID NOT RECEIVE FU APT W ENT.      23 YO WF in ER for follow up after having a procedure on 1/30/23 in ER for her peritonsillar abscess. States she is feeling better and still taking all her meds as prescribed. She was told to follow up with ENT clinic on Thursday but no one ever called her so she decided to come into the ER. Denies fever, chills, chest pain, SOB, abdominal pain, N/V/D, HA or dizziness. No other complaints.     The history is provided by the patient.   Review of patient's allergies indicates:  No Known Allergies  Past Medical History:   Diagnosis Date    ADHD      History reviewed. No pertinent surgical history.  Family History   Problem Relation Age of Onset    Mental illness Mother     Hypertension Father      Social History     Tobacco Use    Smoking status: Never    Smokeless tobacco: Never   Substance Use Topics    Alcohol use: Not Currently     Comment: once a month    Drug use: Never     Review of Systems   Constitutional:  Negative for chills and fever.   HENT:  Positive for sore throat. Negative for congestion, trouble swallowing and voice change.    Respiratory:  Negative for shortness of breath.    Cardiovascular:  Negative for chest pain.   Gastrointestinal:  Negative for abdominal pain, diarrhea, nausea and vomiting.   Genitourinary:  Negative for dysuria.   Musculoskeletal:  Negative for back pain.   Skin:  Negative for rash.   Neurological:  Negative for dizziness, weakness, light-headedness and headaches.   Hematological:  Does not bruise/bleed easily.   All other systems reviewed and are negative.    Physical Exam     Initial Vitals [02/02/23 1525]   BP Pulse Resp Temp SpO2   130/83 (!) 59 16 97.9 °F (36.6 °C) 100 %      MAP       --         Physical Exam    Nursing note and vitals  reviewed.  Constitutional: She appears well-developed and well-nourished. She is not diaphoretic. No distress.   HENT:   Head: Normocephalic and atraumatic.   Mouth/Throat: Posterior oropharyngeal erythema and tonsillar abscesses (much improved with only minimal swelling and erythema, mild brusing noted from aspiration attempt) present. No oropharyngeal exudate or posterior oropharyngeal edema.   Eyes: Conjunctivae are normal.   Cardiovascular:  Normal rate, regular rhythm and normal heart sounds.           Pulmonary/Chest: Breath sounds normal.   Musculoskeletal:         General: Normal range of motion.     Neurological: She is alert and oriented to person, place, and time. She has normal strength.   Skin: Skin is warm and dry.   Psychiatric: She has a normal mood and affect.       ED Course   Procedures  Labs Reviewed - No data to display       Imaging Results    None          Medications - No data to display              ED Course as of 02/02/23 1606   u Feb 02, 2023   1515 Spoke with ENT on call and he recommends will see pt on 2/8/2023 in clinic for follow up at 8:45 AM [TT]      ED Course User Index  [TT] ROCK Chowdhury                 Clinical Impression:   Final diagnoses:  [J36] Peritonsillar abscess (Primary)        ED Disposition Condition    Discharge Stable          ED Prescriptions    None       Follow-up Information       Follow up With Specialties Details Why Contact Info    [facility]  On 2/8/2023 ENT Clinic at 8:45 AM [address] [facility] [address]    [facility] Emergency Medicine In 3 days As needed, If symptoms worsen [address] [facility] [address] [phone]             ROCK Chowdhury  02/02/23 1600

## 2023-02-08 ENCOUNTER — OFFICE VISIT (OUTPATIENT)
Dept: OTOLARYNGOLOGY | Facility: CLINIC | Age: 23
End: 2023-02-08
Payer: MEDICAID

## 2023-02-08 VITALS
HEART RATE: 69 BPM | BODY MASS INDEX: 33.6 KG/M2 | TEMPERATURE: 98 F | SYSTOLIC BLOOD PRESSURE: 113 MMHG | WEIGHT: 221 LBS | DIASTOLIC BLOOD PRESSURE: 79 MMHG

## 2023-02-08 DIAGNOSIS — J36 PERITONSILLAR ABSCESS: ICD-10-CM

## 2023-02-08 DIAGNOSIS — J03.91 RECURRENT TONSILLITIS: Primary | ICD-10-CM

## 2023-02-08 PROCEDURE — 99213 OFFICE O/P EST LOW 20 MIN: CPT | Mod: PBBFAC | Performed by: STUDENT IN AN ORGANIZED HEALTH CARE EDUCATION/TRAINING PROGRAM

## 2023-02-08 NOTE — PROGRESS NOTES
[facility]  Otolaryngology-Head & Neck Surgery    Office Visit    Maggi Hubbard  [id number]  2000    CC: Tonsil infection     HPI: Maggi Hubbard is a 22 y.o. female with recurrent tonsil infections who was seen in the emergency department on 01/30 by ENT for right presumed peritonsillar abscess.  Patient underwent multiple needle aspirations with no return of gita purulence.  Follow-up is recommended to ensure routine healing of the tonsil.  Patient states he has been doing much better she finished her antibiotics not having any trouble swelling.  However she does note that she has these infections about 4 to 5 times a year going on longer than 5 years.  She is treated with antibiotics every time and is seen by a physician or nurse practitioner for diagnosis of strep.  She does note that she also has halitosis and obstructive breathing while her her tonsils are infected.  Denies any troubles with her ears weight loss lymphoma type symptoms ear pain.    ROS:   General: Negative except per HPI  Skin: Denies rash, ulcer, or lesion.  Eyes: Denies vision changes or diplopia.  Ears: Negative except per HPI  Nose: Negative except per HPI  Throat/mouth: Negative except per HPI  Cardiovascular: Negative except per HPI  Respiratory: Negative except per HPI  Neck: Negative except per HPI  Endocrine: Negative except per HPI  Neurologic: Negative except per HPI    Review of patient's allergies indicates:  No Known Allergies    Past Medical History:   Diagnosis Date    ADHD        History reviewed. No pertinent surgical history.    Social History     Socioeconomic History    Marital status: Single   Tobacco Use    Smoking status: Never    Smokeless tobacco: Never   Substance and Sexual Activity    Alcohol use: Not Currently     Comment: once a month    Drug use: Never    Sexual activity: Yes     Partners: Male     Birth control/protection: OCP       Family History   Problem Relation Age of Onset     Mental illness Mother     Hypertension Father        Outpatient Encounter Medications as of 2/8/2023   Medication Sig Dispense Refill    drospirenone-ethinyl estradioL (PARIS) 3-0.02 mg per tablet TAKE 1 TABLET BY MOUTH EVERY DAY 28 tablet 6    methylphenidate HCl 36 MG CR tablet Take 36 mg by mouth every morning.      amoxicillin-clavulanate 875-125mg (AUGMENTIN) 875-125 mg per tablet Take 1 tablet by mouth 2 (two) times daily. for 10 days (Patient not taking: Reported on 2/8/2023) 20 tablet 0    HYDROcodone-acetaminophen (NORCO) 5-325 mg per tablet Take 1 tablet by mouth every 6 (six) hours as needed for Pain. (Patient not taking: Reported on 11/7/2022) 12 tablet 0    methylphenidate HCl 27 MG CR tablet Take 27 mg by mouth every morning.      methylPREDNISolone (MEDROL DOSEPACK) 4 mg tablet use as directed 21 each 0    oxyCODONE (ROXICODONE) 5 MG immediate release tablet Take 1 tablet (5 mg total) by mouth every 6 (six) hours as needed for Pain. (Patient not taking: Reported on 2/8/2023) 12 tablet 0     No facility-administered encounter medications on file as of 2/8/2023.       PHYSICAL EXAM:  Vitals:    02/08/23 0945   BP: 113/79   Pulse: 69   Temp: 97.9 °F (36.6 °C)       General Appearance: well nourished, well-developed, alert, oriented, in no acute distress  Head/Face: NC, AT  Eyes: PEERLA, EOMI, normal conjunctiva  Ears: Hears well at normal conversation volume  AD: external normal, ear canal normal, TM intact, no middle ear fluid  AS: external normal, ear canal normal, TM intact, no middle ear fluid  Nose: septum midline, no inferior turbinate hypertrophy, no polyps  OC/OP: dentition moderate, no oral lesions, FOM and BOT soft, 2+ endophytic tonsils  Nasopharynx, Hypopharynx, and Larynx: indirect visualization attempted, limited view due to patient intolerance  Neck: soft, non-tender, no palpable lymph nodes, thyroid- no nodules or goiter  Neuro: CN II - XII intact  Psychiatric: oriented to time, place and  person, no depression, anxiety or agitation      ASSESSMENT:  Maggi Hubbard is a 22 y.o. female with recurrent tonsil infections meeting Paradise criteria with history of right peritonsillar abscess.  Discussed in length risks benefits and alternatives to tonsillectomy with possible adenoidectomy.  Patient believes that she would like to proceed with tonsillectomy but would like to wait until she has a break from school    PLAN:  --notified patient to call ENT Clinic with any ear nose and throat concerns  --return to clinic in May to discuss tonsillectomy possible adenoidectomy with new resident team      Sue Lopez MD  [facility]  10:15 AM 02/08/2023

## 2023-05-10 ENCOUNTER — OFFICE VISIT (OUTPATIENT)
Dept: OTOLARYNGOLOGY | Facility: CLINIC | Age: 23
End: 2023-05-10
Payer: MEDICAID

## 2023-05-10 VITALS
WEIGHT: 207.19 LBS | SYSTOLIC BLOOD PRESSURE: 118 MMHG | DIASTOLIC BLOOD PRESSURE: 78 MMHG | HEIGHT: 68 IN | TEMPERATURE: 98 F | BODY MASS INDEX: 31.4 KG/M2 | RESPIRATION RATE: 20 BRPM | HEART RATE: 80 BPM | OXYGEN SATURATION: 100 %

## 2023-05-10 DIAGNOSIS — J03.91 RECURRENT TONSILLITIS: Primary | ICD-10-CM

## 2023-05-10 DIAGNOSIS — J36 PERITONSILLAR ABSCESS: ICD-10-CM

## 2023-05-10 PROCEDURE — 99213 OFFICE O/P EST LOW 20 MIN: CPT | Mod: PBBFAC | Performed by: STUDENT IN AN ORGANIZED HEALTH CARE EDUCATION/TRAINING PROGRAM

## 2023-05-10 RX ORDER — BUPROPION HYDROCHLORIDE 100 MG/1
100 TABLET ORAL 2 TIMES DAILY
COMMUNITY
End: 2023-08-02 | Stop reason: ALTCHOICE

## 2023-05-10 NOTE — PROGRESS NOTES
[facility]  Otolaryngology-Head & Neck Surgery    Office Visit    Maggi Hubbard  [id number]  2000    CC: Tonsil infection     HPI: Maggi Hubbard is a 22 y.o. female with recurrent tonsil infections who was seen in the emergency department on 01/30 by ENT for right presumed peritonsillar abscess.  Patient underwent multiple needle aspirations with no return of gita purulence.  Follow-up is recommended to ensure routine healing of the tonsil.  Patient states he has been doing much better she finished her antibiotics not having any trouble swelling.  However she does note that she has these infections about 4 to 5 times a year going on longer than 5 years.  She is treated with antibiotics every time and is seen by a physician or nurse practitioner for diagnosis of strep.  She does note that she also has halitosis and obstructive breathing while her her tonsils are infected.  Denies any troubles with her ears weight loss lymphoma type symptoms ear pain.    5/10/23: Here today for follow up of her tonsils. She reports that she has not had any infections since her PTA. She is now in summer school. She's still interested in tonsillectomy but would like to wait until August when summer school ends. Denies any nasal congestion.     ROS:   General: Negative except per HPI  Skin: Denies rash, ulcer, or lesion.  Eyes: Denies vision changes or diplopia.  Ears: Negative except per HPI  Nose: Negative except per HPI  Throat/mouth: Negative except per HPI  Cardiovascular: Negative except per HPI  Respiratory: Negative except per HPI  Neck: Negative except per HPI  Endocrine: Negative except per HPI  Neurologic: Negative except per HPI    Review of patient's allergies indicates:  No Known Allergies    Past Medical History:   Diagnosis Date    ADHD        History reviewed. No pertinent surgical history.    Social History     Socioeconomic History    Marital status: Single   Tobacco Use    Smoking status:  Former     Passive exposure: Never    Smokeless tobacco: Never    Tobacco comments:     Smoked marijuana   Substance and Sexual Activity    Alcohol use: Not Currently     Comment: once a month    Drug use: Never    Sexual activity: Yes     Partners: Male     Birth control/protection: OCP       Family History   Problem Relation Age of Onset    Mental illness Mother     Hypertension Father        Outpatient Encounter Medications as of 5/10/2023   Medication Sig Dispense Refill    buPROPion (WELLBUTRIN) 100 MG tablet Take 100 mg by mouth 2 (two) times daily.      methylphenidate HCl 36 MG CR tablet Take 36 mg by mouth every morning.      drospirenone-ethinyl estradioL (PARIS) 3-0.02 mg per tablet TAKE 1 TABLET BY MOUTH EVERY DAY (Patient not taking: Reported on 5/10/2023) 28 tablet 6    HYDROcodone-acetaminophen (NORCO) 5-325 mg per tablet Take 1 tablet by mouth every 6 (six) hours as needed for Pain. (Patient not taking: Reported on 11/7/2022) 12 tablet 0    methylphenidate HCl 27 MG CR tablet Take 27 mg by mouth every morning.      oxyCODONE (ROXICODONE) 5 MG immediate release tablet Take 1 tablet (5 mg total) by mouth every 6 (six) hours as needed for Pain. (Patient not taking: Reported on 2/8/2023) 12 tablet 0     No facility-administered encounter medications on file as of 5/10/2023.       PHYSICAL EXAM:  Vitals:    05/10/23 1242   BP: 118/78   Pulse: 80   Resp: 20   Temp: 98.2 °F (36.8 °C)       General Appearance: well nourished, well-developed, alert, oriented, in no acute distress  Head/Face: NC, AT  Eyes: PEERLA, EOMI, normal conjunctiva  Ears: Hears well at normal conversation volume  AD: external normal, ear canal normal, TM intact, no middle ear fluid  AS: external normal, ear canal normal, TM intact, no middle ear fluid  Nose: septum midline, no inferior turbinate hypertrophy, no polyps  OC/OP: dentition moderate, no oral lesions, FOM and BOT soft, 2-3+ tonsils  Neck: soft, non-tender, no palpable lymph  nodes, thyroid- no nodules or goiter  Neuro: CN II - XII intact  Psychiatric: oriented to time, place and person, no depression, anxiety or agitation      ASSESSMENT/PLAN:  Maggi Hubbard is a 22 y.o. female with recurrent tonsil infections meeting Paradise criteria with history of right peritonsillar abscess.  She is interested in tonsillectomy, possible adenoidectomy but is in summer school. She would like to come back to clinic in July to meet the new team and schedule surgery in August. Call ENT in the meantime if there are any issues.     Shira Cervantes MD  [facility]  10:15 AM 05/10/2023

## 2023-05-12 NOTE — PROGRESS NOTES
I have reviewed the history, exam, +/- procedure, and/ or medical decision making performed this visit. I had face to face contact with patient during this encounter and participated in a substantive portion of this visit.

## 2023-07-13 ENCOUNTER — OFFICE VISIT (OUTPATIENT)
Dept: OTOLARYNGOLOGY | Facility: CLINIC | Age: 23
End: 2023-07-13
Payer: MEDICAID

## 2023-07-13 VITALS
WEIGHT: 212 LBS | HEART RATE: 106 BPM | SYSTOLIC BLOOD PRESSURE: 113 MMHG | BODY MASS INDEX: 32.5 KG/M2 | DIASTOLIC BLOOD PRESSURE: 75 MMHG

## 2023-07-13 DIAGNOSIS — J36 PERITONSILLAR ABSCESS: ICD-10-CM

## 2023-07-13 DIAGNOSIS — J03.91 RECURRENT TONSILLITIS: Primary | ICD-10-CM

## 2023-07-13 PROCEDURE — 99213 OFFICE O/P EST LOW 20 MIN: CPT | Mod: PBBFAC

## 2023-07-13 RX ORDER — DEXAMETHASONE SODIUM PHOSPHATE 4 MG/ML
8 INJECTION, SOLUTION INTRA-ARTICULAR; INTRALESIONAL; INTRAMUSCULAR; INTRAVENOUS; SOFT TISSUE
Status: CANCELLED | OUTPATIENT
Start: 2023-07-13

## 2023-07-13 RX ORDER — LIDOCAINE HYDROCHLORIDE 10 MG/ML
1 INJECTION, SOLUTION EPIDURAL; INFILTRATION; INTRACAUDAL; PERINEURAL ONCE
Status: CANCELLED | OUTPATIENT
Start: 2023-07-13 | End: 2023-07-13

## 2023-07-13 RX ORDER — SODIUM CHLORIDE 0.9 % (FLUSH) 0.9 %
10 SYRINGE (ML) INJECTION
Status: DISPENSED | OUTPATIENT
Start: 2023-07-13

## 2023-07-13 NOTE — PROGRESS NOTES
[facility]  Otolaryngology-Head & Neck Surgery    Office Visit    Maggi Hubbard  [id number]  2000    CC: Tonsil infection     HPI: Maggi Hubbard is a 23 y.o. female with recurrent tonsil infections who was seen in the emergency department on 01/30 by ENT for right presumed peritonsillar abscess.  Patient underwent multiple needle aspirations with no return of gita purulence.  Follow-up is recommended to ensure routine healing of the tonsil.  Patient states he has been doing much better she finished her antibiotics not having any trouble swelling.  However she does note that she has these infections about 4 to 5 times a year going on longer than 5 years.  She is treated with antibiotics every time and is seen by a physician or nurse practitioner for diagnosis of strep.  She does note that she also has halitosis and obstructive breathing while her her tonsils are infected.  Denies any troubles with her ears weight loss lymphoma type symptoms ear pain.    5/10/23: Here today for follow up of her tonsils. She reports that she has not had any infections since her PTA. She is now in summer school. She's still interested in tonsillectomy but would like to wait until August when summer school ends. Denies any nasal congestion.     7/13/2023: She returns today in follow up. She finished with summer school and would like to proceed with scheduling surgery. She goes back to school at the end of August.    ROS:   General: Negative except per HPI  Skin: Denies rash, ulcer, or lesion.  Eyes: Denies vision changes or diplopia.  Ears: Negative except per HPI  Nose: Negative except per HPI  Throat/mouth: Negative except per HPI  Cardiovascular: Negative except per HPI  Respiratory: Negative except per HPI  Neck: Negative except per HPI  Endocrine: Negative except per HPI  Neurologic: Negative except per HPI    Review of patient's allergies indicates:  No Known Allergies    Past Medical History:    Diagnosis Date    ADHD        History reviewed. No pertinent surgical history.    Social History     Socioeconomic History    Marital status: Single   Tobacco Use    Smoking status: Former     Passive exposure: Never    Smokeless tobacco: Never    Tobacco comments:     Smoked marijuana   Substance and Sexual Activity    Alcohol use: Not Currently     Comment: once a month    Drug use: Never    Sexual activity: Yes     Partners: Male     Birth control/protection: OCP       Family History   Problem Relation Age of Onset    Mental illness Mother     Hypertension Father        Outpatient Encounter Medications as of 7/13/2023   Medication Sig Dispense Refill    methylphenidate HCl 27 MG CR tablet Take 27 mg by mouth every morning.      methylphenidate HCl 36 MG CR tablet Take 36 mg by mouth every morning.      buPROPion (WELLBUTRIN) 100 MG tablet Take 100 mg by mouth 2 (two) times daily.      drospirenone-ethinyl estradioL (PARIS) 3-0.02 mg per tablet TAKE 1 TABLET BY MOUTH EVERY DAY (Patient not taking: Reported on 5/10/2023) 28 tablet 6    HYDROcodone-acetaminophen (NORCO) 5-325 mg per tablet Take 1 tablet by mouth every 6 (six) hours as needed for Pain. (Patient not taking: Reported on 11/7/2022) 12 tablet 0    oxyCODONE (ROXICODONE) 5 MG immediate release tablet Take 1 tablet (5 mg total) by mouth every 6 (six) hours as needed for Pain. (Patient not taking: Reported on 2/8/2023) 12 tablet 0     No facility-administered encounter medications on file as of 7/13/2023.       PHYSICAL EXAM:  Vitals:    07/13/23 1533   BP: 113/75   Pulse: 106       General Appearance: well nourished, well-developed, alert, oriented, in no acute distress  Head/Face: NC, AT  Eyes: PEERLA, EOMI, normal conjunctiva  Ears: Hears well at normal conversation volume  Nose: septum midline, no inferior turbinate hypertrophy, no polyps  OC/OP: dentition moderate, no oral lesions, FOM and BOT soft, 2-3+ tonsils  Neck: soft, non-tender, no palpable  lymph nodes, thyroid- no nodules or goiter  Neuro: CN II - XII intact  Psychiatric: oriented to time, place and person, no depression, anxiety or agitation      ASSESSMENT/PLAN:  Maggi Hubbard is a 23 y.o. female with recurrent tonsil infections meeting Paradise criteria with history of right peritonsillar abscess.      - Plan for tonsillectomy, possible adenoidectomy on 8/7/23.  - We discussed indications for tonsillectomy, risks and benefits of the procedure including post operative bleeding and pain. She understands and all questions were answered. Informed consent obtained.  - Plan to see her back in clinic 2 weeks post op.      Marisa Goodwin MD  [facility]  10:15 AM 07/13/2023

## 2024-02-02 ENCOUNTER — OFFICE VISIT (OUTPATIENT)
Dept: URGENT CARE | Facility: CLINIC | Age: 24
End: 2024-02-02
Payer: MEDICAID

## 2024-02-02 VITALS
OXYGEN SATURATION: 98 % | RESPIRATION RATE: 18 BRPM | SYSTOLIC BLOOD PRESSURE: 132 MMHG | DIASTOLIC BLOOD PRESSURE: 89 MMHG | BODY MASS INDEX: 45.99 KG/M2 | HEIGHT: 67 IN | HEART RATE: 101 BPM | WEIGHT: 293 LBS | TEMPERATURE: 98 F

## 2024-02-02 DIAGNOSIS — U07.1 CLINICAL DIAGNOSIS OF COVID-19: Primary | ICD-10-CM

## 2024-02-02 DIAGNOSIS — R09.81 NASAL CONGESTION: ICD-10-CM

## 2024-02-02 DIAGNOSIS — R68.89 FLU-LIKE SYMPTOMS: ICD-10-CM

## 2024-02-02 DIAGNOSIS — J03.90 TONSILLITIS: ICD-10-CM

## 2024-02-02 DIAGNOSIS — Z76.89 ENCOUNTER TO ESTABLISH CARE: ICD-10-CM

## 2024-02-02 LAB
CTP QC/QA: YES
MOLECULAR STREP A: NEGATIVE
POC MOLECULAR INFLUENZA A AGN: NEGATIVE
POC MOLECULAR INFLUENZA B AGN: NEGATIVE
SARS-COV-2 RDRP RESP QL NAA+PROBE: POSITIVE

## 2024-02-02 PROCEDURE — 99213 OFFICE O/P EST LOW 20 MIN: CPT | Mod: PBBFAC

## 2024-02-02 PROCEDURE — 87502 INFLUENZA DNA AMP PROBE: CPT | Mod: PBBFAC

## 2024-02-02 PROCEDURE — 87635 SARS-COV-2 COVID-19 AMP PRB: CPT | Mod: PBBFAC

## 2024-02-02 PROCEDURE — 99214 OFFICE O/P EST MOD 30 MIN: CPT | Mod: S$PBB,,,

## 2024-02-02 PROCEDURE — 87651 STREP A DNA AMP PROBE: CPT | Mod: PBBFAC

## 2024-02-02 RX ORDER — AMOXICILLIN AND CLAVULANATE POTASSIUM 875; 125 MG/1; MG/1
1 TABLET, FILM COATED ORAL EVERY 12 HOURS
Qty: 20 TABLET | Refills: 0 | Status: SHIPPED | OUTPATIENT
Start: 2024-02-02 | End: 2024-02-12

## 2024-02-02 RX ORDER — FLUTICASONE PROPIONATE 50 MCG
1 SPRAY, SUSPENSION (ML) NASAL DAILY
Qty: 9.9 ML | Refills: 0 | Status: SHIPPED | OUTPATIENT
Start: 2024-02-02

## 2024-02-02 RX ORDER — DULOXETIN HYDROCHLORIDE 30 MG/1
30 CAPSULE, DELAYED RELEASE ORAL 2 TIMES DAILY
COMMUNITY
Start: 2023-12-06

## 2024-02-02 NOTE — LETTER
February 2, 2024      [facility]  [facility] [facility] [address]  Phone: [phone]       Patient: Maggi Hubbard   YOB: 2000  Date of Visit: 02/02/2024    To Whom It May Concern:    Kisha Hubbard  was at [facility] on 02/02/2024. The patient may return to work/school on 2/5/24 with no restrictions. If you have any questions or concerns, or if I can be of further assistance, please do not hesitate to contact me.    Sincerely,    ROSINA Vela

## 2024-02-02 NOTE — PROGRESS NOTES
"Subjective:       Patient ID: Maggi Hubbard is a 23 y.o. female.    Vitals:  height is 5' 7" (1.702 m) and weight is 139 kg (306 lb 7 oz) (abnormal). Her oral temperature is 98.2 °F (36.8 °C). Her blood pressure is 132/89 and her pulse is 101. Her respiration is 18 and oxygen saturation is 98%.     Chief Complaint: Sore Throat (Patient reports sore throat, congestion, HA x 1 week )    23-year-old female presents to clinic alone.  Reports symptoms x1 week which has worsened over the past 2 days.  States she has a history of peritonsillar abscess, states symptoms are slightly similar.  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  Has been taking Advil decongestion with some relief.    Sore Throat   Associated symptoms include congestion, coughing and shortness of breath. Pertinent negatives include no ear pain.       Constitution: Positive for chills and generalized weakness.   HENT:  Positive for congestion and sore throat. Negative for ear pain.    Respiratory:  Positive for cough, sputum production and shortness of breath. Negative for asthma.         Clear mucus    Gastrointestinal:  Positive for nausea.   Allergic/Immunologic: Negative for asthma.   Neurological:  Positive for dizziness and light-headedness.       Objective:      Physical Exam   Constitutional: She is oriented to person, place, and time. She is cooperative. She is easily aroused. She does not appear ill. obesityawake  HENT:   Head: Normocephalic and atraumatic.   Ears:   Right Ear: Tympanic membrane normal.   Left Ear: Tympanic membrane normal.   Nose: Right sinus exhibits maxillary sinus tenderness. Right sinus exhibits no frontal sinus tenderness. Left sinus exhibits no frontal sinus tenderness.   Mouth/Throat: Uvula is midline and mucous membranes are normal. Posterior oropharyngeal erythema present. No tonsillar abscesses or cobblestoning. Tonsils are 2+ on the right. Tonsils are 2+ on the left. No tonsillar exudate.   Eyes: " Conjunctivae and lids are normal.   Neck: Neck supple.   Cardiovascular: Normal rate, regular rhythm, S1 normal, S2 normal and normal heart sounds.   Pulmonary/Chest: Effort normal and breath sounds normal.   Lymphadenopathy:     She has no cervical adenopathy.   Neurological: She is alert, oriented to person, place, and time and easily aroused. GCS eye subscore is 4. GCS verbal subscore is 5. GCS motor subscore is 6.   Skin: Skin is warm, dry and intact. Capillary refill takes less than 2 seconds.   Psychiatric: Her behavior is normal.   Nursing note and vitals reviewed.        Assessment:       1. Clinical diagnosis of COVID-19    2. Flu-like symptoms    3. Nasal congestion    4. Tonsillitis    5. Encounter to establish care          Plan:     Patient has tested positive for COVID.  Encouraged patient to quarantine for 5 days from onset of worsening symptoms.  May take Tylenol/Motrin as needed for pain/fever.  We will prescribe course of Augmentin prophylactically for high risk of tonsillar abscess.  Instructed patient to follow up in the emergency department if symptoms worsen.  Appears stable for discharge at this time.    Clinical diagnosis of COVID-19    Flu-like symptoms  -     POCT Strep A, Molecular  -     POCT Influenza A/B MOLECULAR  -     POCT COVID-19 Rapid Screening    Nasal congestion  -     fluticasone propionate (FLONASE) 50 mcg/actuation nasal spray; 1 spray (50 mcg total) by Each Nostril route once daily.  Dispense: 9.9 mL; Refill: 0    Tonsillitis  -     amoxicillin-clavulanate 875-125mg (AUGMENTIN) 875-125 mg per tablet; Take 1 tablet by mouth every 12 (twelve) hours. for 10 days  Dispense: 20 tablet; Refill: 0    Encounter to establish care  -     Ambulatory referral/consult to Family Practice           Results for orders placed or performed in visit on 02/02/24   POCT Strep A, Molecular   Result Value Ref Range    Molecular Strep A, POC Negative Negative     Acceptable Yes     POCT Influenza A/B MOLECULAR   Result Value Ref Range    POC Molecular Influenza A Ag Negative Negative, Not Reported    POC Molecular Influenza B Ag Negative Negative, Not Reported     Acceptable Yes    POCT COVID-19 Rapid Screening   Result Value Ref Range    POC Rapid COVID Positive (A) Negative     Acceptable Yes

## 2024-11-07 ENCOUNTER — OFFICE VISIT (OUTPATIENT)
Dept: URGENT CARE | Facility: CLINIC | Age: 24
End: 2024-11-07
Payer: MEDICAID

## 2024-11-07 VITALS
WEIGHT: 293 LBS | SYSTOLIC BLOOD PRESSURE: 150 MMHG | OXYGEN SATURATION: 98 % | RESPIRATION RATE: 17 BRPM | BODY MASS INDEX: 45.99 KG/M2 | HEART RATE: 115 BPM | TEMPERATURE: 98 F | DIASTOLIC BLOOD PRESSURE: 80 MMHG | HEIGHT: 67 IN

## 2024-11-07 DIAGNOSIS — B35.4 TINEA CORPORIS: Primary | ICD-10-CM

## 2024-11-07 DIAGNOSIS — K12.1 MOUTH ULCERS: ICD-10-CM

## 2024-11-07 DIAGNOSIS — R05.1 ACUTE COUGH: ICD-10-CM

## 2024-11-07 DIAGNOSIS — J06.9 ACUTE URI: ICD-10-CM

## 2024-11-07 LAB
CTP QC/QA: YES
MOLECULAR STREP A: NEGATIVE
POC MOLECULAR INFLUENZA A AGN: NEGATIVE
POC MOLECULAR INFLUENZA B AGN: NEGATIVE
SARS-COV-2 RDRP RESP QL NAA+PROBE: NEGATIVE

## 2024-11-07 PROCEDURE — 87651 STREP A DNA AMP PROBE: CPT | Mod: PBBFAC | Performed by: NURSE PRACTITIONER

## 2024-11-07 PROCEDURE — 87502 INFLUENZA DNA AMP PROBE: CPT | Mod: PBBFAC | Performed by: NURSE PRACTITIONER

## 2024-11-07 PROCEDURE — 87635 SARS-COV-2 COVID-19 AMP PRB: CPT | Mod: PBBFAC | Performed by: NURSE PRACTITIONER

## 2024-11-07 PROCEDURE — 99214 OFFICE O/P EST MOD 30 MIN: CPT | Mod: PBBFAC | Performed by: NURSE PRACTITIONER

## 2024-11-07 PROCEDURE — 99214 OFFICE O/P EST MOD 30 MIN: CPT | Mod: S$PBB,,, | Performed by: NURSE PRACTITIONER

## 2024-11-07 RX ORDER — CLOTRIMAZOLE AND BETAMETHASONE DIPROPIONATE 10; .64 MG/G; MG/G
CREAM TOPICAL 2 TIMES DAILY
Qty: 15 G | Refills: 0 | Status: SHIPPED | OUTPATIENT
Start: 2024-11-07 | End: 2024-11-14

## 2024-11-07 RX ORDER — PROMETHAZINE HYDROCHLORIDE AND DEXTROMETHORPHAN HYDROBROMIDE 6.25; 15 MG/5ML; MG/5ML
5 SYRUP ORAL
Qty: 120 ML | Refills: 0 | Status: SHIPPED | OUTPATIENT
Start: 2024-11-07

## 2024-11-07 NOTE — PROGRESS NOTES
"Subjective:      Patient ID: Maggi Hubbard is a 24 y.o. female.    Vitals:  height is 5' 7" (1.702 m) and weight is 157.9 kg (348 lb) (abnormal). Her oral temperature is 97.8 °F (36.6 °C). Her blood pressure is 150/80 (abnormal) and her pulse is 115 (abnormal). Her respiration is 17 and oxygen saturation is 98%.     Chief Complaint: URI (Pt reports congestion, sinus congestion and drip, coughing, sore throat, slight vertigo, and sores on her mouth. In the past 1.5 years ago she had the same symptoms that formed abscesses (and she would like some treatment to make sure that it doesn't develop into that infection again). Mouth sores make it hard to eat due to pain. Started about 3 days ago. ) and Rash (Pt reports that she went camping last weekend and noticed a rash that looked like ring worm around 11/4/24. Says may also be from her cat. )    URI   Associated symptoms include congestion, coughing, a rash, sinus pain and a sore throat.   Rash  Associated symptoms include congestion, coughing, fatigue and a sore throat.       Constitution: Positive for fatigue.   HENT:  Positive for congestion, postnasal drip, sinus pain, sinus pressure, sore throat and trouble swallowing.    Neck: neck negative.   Cardiovascular: Negative.    Respiratory:  Positive for cough.    Gastrointestinal: Negative.    Endocrine: negative.   Genitourinary: Negative.    Musculoskeletal: Negative.    Skin:  Positive for rash.   Allergic/Immunologic: Negative.    Neurological:  Positive for dizziness.   Hematologic/Lymphatic: Negative.    Psychiatric/Behavioral: Negative.        Objective:     Physical Exam   Constitutional: She is oriented to person, place, and time. She appears well-developed. She is cooperative. morbidly obeseawake  HENT:   Head: Normocephalic and atraumatic.   Ears:   Right Ear: Hearing, tympanic membrane, external ear and ear canal normal.   Left Ear: Hearing, tympanic membrane, external ear and ear canal normal.   Nose: " Congestion present. No mucosal edema or nasal deformity. No epistaxis. Right sinus exhibits no maxillary sinus tenderness and no frontal sinus tenderness. Left sinus exhibits no maxillary sinus tenderness and no frontal sinus tenderness.   Mouth/Throat: Uvula is midline, oropharynx is clear and moist and mucous membranes are normal. Mucous membranes are moist. No trismus in the jaw. Normal dentition. No uvula swelling. Cobblestoning present.   Eyes: Conjunctivae and lids are normal.   Neck: Trachea normal and phonation normal. Neck supple.   Cardiovascular: Regular rhythm, normal heart sounds and normal pulses. Tachycardia present.   Pulmonary/Chest: Effort normal and breath sounds normal.   Abdominal: Normal appearance and bowel sounds are normal. Soft.   Musculoskeletal: Normal range of motion.         General: Normal range of motion.   Neurological: She is alert and oriented to person, place, and time. She exhibits normal muscle tone.   Skin: Skin is warm, dry, intact, rash and urticarial.        Psychiatric: Her speech is normal and behavior is normal. Judgment and thought content normal.   Nursing note and vitals reviewed.         Previous History      Review of patient's allergies indicates:  No Known Allergies    Past Medical History:   Diagnosis Date    ADHD      Current Outpatient Medications   Medication Instructions    ARIPiprazole (ABILIFY) 5 mg, Daily    clotrimazole-betamethasone 1-0.05% (LOTRISONE) cream Topical (Top), 2 times daily    diphenhydrAMINE-aluminum-magnesium hydroxide-simethicone-LIDOcaine viscous HCl 2% 5 mLs, Swish & Spit, Every 4 hours PRN    DULoxetine (CYMBALTA) 30 mg, 2 times daily    fluticasone propionate (FLONASE) 50 mcg, Each Nostril, Daily    methylphenidate HCl 45 mg, Every morning    promethazine-dextromethorphan (PROMETHAZINE-DM) 6.25-15 mg/5 mL Syrp 5 mLs, Oral, Every 6-8 hours PRN, May cause sedation.  Do not drive or operate heavy machinery after taking this medication.  "    History reviewed. No pertinent surgical history.  Family History   Problem Relation Name Age of Onset    Mental illness Mother      Hypertension Father         Social History     Tobacco Use    Smoking status: Never     Passive exposure: Never    Smokeless tobacco: Never    Tobacco comments:     Smoked marijuana   Substance Use Topics    Alcohol use: Not Currently     Comment: once a month    Drug use: Never        Physical Exam      Vital Signs Reviewed   BP (!) 150/80   Pulse (!) 115   Temp 97.8 °F (36.6 °C) (Oral)   Resp 17   Ht 5' 7" (1.702 m)   Wt (!) 157.9 kg (348 lb)   LMP 10/20/2024   SpO2 98%   BMI 54.50 kg/m²        Procedures    Procedures     Labs     Results for orders placed or performed in visit on 11/07/24   POCT Strep A, Molecular    Collection Time: 11/07/24  6:08 PM   Result Value Ref Range    Molecular Strep A, POC Negative Negative     Acceptable Yes    POCT COVID-19 Rapid Screening    Collection Time: 11/07/24  6:09 PM   Result Value Ref Range    POC Rapid COVID Negative Negative     Acceptable Yes    POCT Influenza A/B Molecular    Collection Time: 11/07/24  6:10 PM   Result Value Ref Range    POC Molecular Influenza A Ag Negative Negative    POC Molecular Influenza B Ag Negative Negative     Acceptable Yes      Assessment:     1. Tinea corporis    2. Mouth ulcers    3. Acute URI    4. Acute cough        Plan:   Tinea corporis, also called ringworm, is a skin infection caused by a fungus. It usually affects the skin on the face, chest, or limbs. Tinea corporis is most common in children and athletes.  DISCHARGE INSTRUCTIONS:  Call your doctor if:  You have a fever.  Your rash continues to spread after 7 days of treatment.  Your rash is not gone within 2 weeks.  The area around your sore becomes red, warm, tender, swollen, or smells bad.  You have questions or concerns about your condition or care.  Medicines:  Antifungal medicine may be " given as a cream or pill. Use the medicine until it is gone, even if it looks like your infection is gone sooner.  Take your medicine as directed. Contact your healthcare provider if you think your medicine is not helping or if you have side effects. Tell your provider if you are allergic to any medicine. Keep a list of the medicines, vitamins, and herbs you take. Include the amounts, and when and why you take them. Bring the list or the pill bottles to follow-up visits. Carry your medicine list with you in case of an emergency.  Prevent tinea corporis:  Wash all items that come into contact with infected skin. Wash all towels, clothes, and bedding in hot water. Use laundry soap. Clean shower stalls, mats, and floors with a germ-killing or fungus-killing .  Do not share personal items. Examples include towels, brushes, conner, and hair accessories.  Keep your skin, hair, and nails clean and dry. Dry your skin before you put medicine on the infected area. Wash your hands often. Do not scratch your sores. This may cause the infection to spread.     Do not participate in contact sports, as directed. Talk to your provider before you participate in contact sports, such as wrestling. Your provider may tell you to wait for 72 hours after you start treatment.  Have infected pets treated by a . A patch of missing fur is a sign of infection in a pet. Wear gloves and long sleeves if you handle an infected animal. Always wash your hands after handling the animal. Vacuum your home to remove infected fur or skin flakes. Disinfect surfaces and bedding that your animal comes into contact with.     Tinea corporis  -     POCT COVID-19 Rapid Screening  -     POCT Influenza A/B Molecular  -     POCT Strep A, Molecular  -     clotrimazole-betamethasone 1-0.05% (LOTRISONE) cream; Apply topically 2 (two) times daily. for 7 days  Dispense: 15 g; Refill: 0    Mouth ulcers  -     diphenhydrAMINE-aluminum-magnesium  hydroxide-simethicone-LIDOcaine viscous HCl 2%; Swish and spit 5 mLs every 4 (four) hours as needed (pain).  Dispense: 200 each; Refill: 0    Acute URI    Acute cough  -     promethazine-dextromethorphan (PROMETHAZINE-DM) 6.25-15 mg/5 mL Syrp; Take 5 mLs by mouth every 6 to 8 hours as needed (cough). May cause sedation.  Do not drive or operate heavy machinery after taking this medication.  Dispense: 120 mL; Refill: 0

## 2024-11-08 NOTE — PATIENT INSTRUCTIONS
An upper respiratory infection is also called a cold. It can affect your nose, throat, ears, and sinuses. Cold symptoms are usually worst for the first 3 to 5 days. Most people get better in 7 to 14 days. You may continue to cough for 2 to 3 weeks. Colds are caused by viruses and do not get better with antibiotics.    DISCHARGE INSTRUCTIONS:    Call your local emergency number (911 in the [place]) if:  You have chest pain or trouble breathing.    Seek care immediately if:  You have a fever over 102ºF (39ºC).    Call your doctor if:  You have a low fever.  Your sore throat gets worse or you see white or yellow spots in your throat.  Your symptoms get worse after 3 to 5 days or are not better in 14 days.  You have a rash anywhere on your skin.  You have large, tender lumps in your neck.  You have thick, green, or yellow drainage from your nose.  You cough up thick yellow, green, or bloody mucus.  You have a bad earache.  You have questions or concerns about your condition or care.    Medicines:  You may need any of the following:    Decongestants help reduce nasal congestion and help you breathe more easily. If you take decongestant pills, they may make you feel restless or cause problems with your sleep. Do not use decongestant sprays for more than a few days.    Cough suppressants help reduce coughing. Ask your healthcare provider which type of cough medicine is best for you.    NSAIDs , such as ibuprofen, help decrease swelling, pain, and fever. NSAIDs can cause stomach bleeding or kidney problems in certain people. If you take blood thinner medicine, always ask your healthcare provider if NSAIDs are safe for you. Always read the medicine label and follow directions.    Acetaminophen decreases pain and fever. It is available without a doctor's order. Ask how much to take and how often to take it. Follow directions. Read the labels of all other medicines you are using to see if they also contain acetaminophen, or ask  your doctor or pharmacist. Acetaminophen can cause liver damage if not taken correctly.    Take your medicine as directed. Contact your healthcare provider if you think your medicine is not helping or if you have side effects. Tell your provider if you are allergic to any medicine. Keep a list of the medicines, vitamins, and herbs you take. Include the amounts, and when and why you take them. Bring the list or the pill bottles to follow-up visits. Carry your medicine list with you in case of an emergency.    Self-care:  Rest as much as possible. Slowly start to do more each day.    Drink more liquids as directed. Liquids will help thin and loosen mucus so you can cough it up. Liquids will also help prevent dehydration. Liquids that help prevent dehydration include water, fruit juice, and broth. Do not drink liquids that contain caffeine. Caffeine can increase your risk for dehydration. Ask your healthcare provider how much liquid to drink each day.  Soothe a sore throat. Gargle with warm salt water. Make salt water by dissolving ¼ teaspoon salt in 1 cup warm water. You may also suck on hard candy or throat lozenges. You may use a sore throat spray.    Use a humidifier or vaporizer. Use a cool mist humidifier or a vaporizer to increase air moisture in your home. This may make it easier for you to breathe and help decrease your cough.  Use saline nasal drops as directed. These help relieve congestion.    Apply petroleum-based jelly around the outside of your nostrils. This can decrease irritation from blowing your nose.    Do not smoke. Nicotine and other chemicals in cigarettes and cigars can make your symptoms worse. They can also cause infections such as bronchitis or pneumonia. Ask your healthcare provider for information if you currently smoke and need help to quit. E-cigarettes or smokeless tobacco still contain nicotine. Talk to your healthcare provider before you use these products.    Prevent a cold:  Wash  your hands often. Use soap and water every time you wash your hands. Rub your soapy hands together, lacing your fingers. Use the fingers of one hand to scrub under the nails of the other hand. Wash for at least 20 seconds. Rinse with warm, running water for several seconds. Then dry your hands. Use hand  gel if soap and water are not available. Do not touch your eyes or mouth without washing your hands first.    Handwashing    Cover a sneeze or cough. Use a tissue that covers your mouth and nose. Put the used tissue in the trash right away. Use the bend of your arm if a tissue is not available. Wash your hands well with soap and water or use a hand . Do not stand close to anyone who is sneezing or coughing.    Try to stay away from others while you are sick. This is especially important during the first 2 to 3 days when the virus is more easily spread. Wait until a fever, cough, or other symptoms are gone before you return to work or other regular activities.    Do not share items while you are sick. This includes food, drinks, eating utensils, and dishes.     Tinea corporis, also called ringworm, is a skin infection caused by a fungus. It usually affects the skin on the face, chest, or limbs. Tinea corporis is most common in children and athletes.  DISCHARGE INSTRUCTIONS:  Call your doctor if:  You have a fever.  Your rash continues to spread after 7 days of treatment.  Your rash is not gone within 2 weeks.  The area around your sore becomes red, warm, tender, swollen, or smells bad.  You have questions or concerns about your condition or care.  Medicines:  Antifungal medicine may be given as a cream or pill. Use the medicine until it is gone, even if it looks like your infection is gone sooner.  Take your medicine as directed. Contact your healthcare provider if you think your medicine is not helping or if you have side effects. Tell your provider if you are allergic to any medicine. Keep a list  of the medicines, vitamins, and herbs you take. Include the amounts, and when and why you take them. Bring the list or the pill bottles to follow-up visits. Carry your medicine list with you in case of an emergency.  Prevent tinea corporis:  Wash all items that come into contact with infected skin. Wash all towels, clothes, and bedding in hot water. Use laundry soap. Clean shower stalls, mats, and floors with a germ-killing or fungus-killing .  Do not share personal items. Examples include towels, brushes, conner, and hair accessories.  Keep your skin, hair, and nails clean and dry. Dry your skin before you put medicine on the infected area. Wash your hands often. Do not scratch your sores. This may cause the infection to spread.     Do not participate in contact sports, as directed. Talk to your provider before you participate in contact sports, such as wrestling. Your provider may tell you to wait for 72 hours after you start treatment.  Have infected pets treated by a . A patch of missing fur is a sign of infection in a pet. Wear gloves and long sleeves if you handle an infected animal. Always wash your hands after handling the animal. Vacuum your home to remove infected fur or skin flakes. Disinfect surfaces and bedding that your animal comes into contact with.

## 2024-11-10 ENCOUNTER — TELEPHONE (OUTPATIENT)
Dept: URGENT CARE | Facility: CLINIC | Age: 24
End: 2024-11-10
Payer: MEDICAID

## 2024-11-14 ENCOUNTER — OFFICE VISIT (OUTPATIENT)
Dept: URGENT CARE | Facility: CLINIC | Age: 24
End: 2024-11-14
Payer: MEDICAID

## 2024-11-14 VITALS
BODY MASS INDEX: 45.99 KG/M2 | TEMPERATURE: 98 F | HEART RATE: 118 BPM | OXYGEN SATURATION: 99 % | RESPIRATION RATE: 18 BRPM | HEIGHT: 67 IN | DIASTOLIC BLOOD PRESSURE: 84 MMHG | SYSTOLIC BLOOD PRESSURE: 136 MMHG | WEIGHT: 293 LBS

## 2024-11-14 DIAGNOSIS — R19.7 DIARRHEA, UNSPECIFIED TYPE: Primary | ICD-10-CM

## 2024-11-14 DIAGNOSIS — R11.0 NAUSEA: ICD-10-CM

## 2024-11-14 LAB — GLUCOSE SERPL-MCNC: 89 MG/DL (ref 70–110)

## 2024-11-14 PROCEDURE — 82962 GLUCOSE BLOOD TEST: CPT | Mod: PBBFAC | Performed by: NURSE PRACTITIONER

## 2024-11-14 PROCEDURE — 99214 OFFICE O/P EST MOD 30 MIN: CPT | Mod: PBBFAC | Performed by: NURSE PRACTITIONER

## 2024-11-14 PROCEDURE — 99213 OFFICE O/P EST LOW 20 MIN: CPT | Mod: S$PBB,,, | Performed by: NURSE PRACTITIONER

## 2024-11-14 RX ORDER — ARIPIPRAZOLE 10 MG/1
10 TABLET ORAL
COMMUNITY
Start: 2024-10-18

## 2024-11-14 RX ORDER — METHYLPHENIDATE HYDROCHLORIDE 54 MG/1
54 TABLET ORAL
COMMUNITY
Start: 2024-10-20

## 2024-11-14 RX ORDER — ONDANSETRON 4 MG/1
4 TABLET, ORALLY DISINTEGRATING ORAL EVERY 6 HOURS PRN
Qty: 16 TABLET | Refills: 0 | Status: SHIPPED | OUTPATIENT
Start: 2024-11-14 | End: 2024-11-18

## 2024-11-14 RX ORDER — DULOXETIN HYDROCHLORIDE 60 MG/1
60 CAPSULE, DELAYED RELEASE ORAL
COMMUNITY
Start: 2024-10-23

## 2024-11-14 NOTE — PROGRESS NOTES
"Subjective:      Patient ID: Maggi Hubbard is a 24 y.o. female.    Vitals:  height is 5' 7" (1.702 m) and weight is 156 kg (344 lb) (abnormal). Her temperature is 97.8 °F (36.6 °C). Her blood pressure is 136/84 and her pulse is 118 (abnormal). Her respiration is 18 and oxygen saturation is 99%.     Chief Complaint: Diarrhea (Pt c/o diarrhea , upset stomach , nausea , gas  x Monday /Pt states she started Mounjaro last month has not taken since Monday /Denies fever /Pt states she's taking Mounjaro from her Mother in law not prescribed for her )    Diarrhea      as stated in CC.  Patient reports several bouts of diarrhea upset stomach.  Pt reports she was originally taking  2.5 dose and then increased dose to 5. Pt denies cough, shortness for breath or chest pain, dizziness, weakness, vomiting,     Gastrointestinal:  Positive for diarrhea.      Objective:     Physical Exam   Constitutional: She is oriented to person, place, and time. She appears well-developed. She is cooperative.  Non-toxic appearance. She does not appear ill. No distress. morbidly obeseawake  HENT:   Head: Atraumatic.   Nose: Nose normal. No purulent discharge. Right sinus exhibits no maxillary sinus tenderness and no frontal sinus tenderness. Left sinus exhibits no maxillary sinus tenderness and no frontal sinus tenderness.   Mouth/Throat: Uvula is midline.   Eyes: Conjunctivae are normal. Right eye exhibits no discharge. Left eye exhibits no discharge. Extraocular movement intact   Neck: Neck supple. No neck rigidity present.   Cardiovascular: Regular rhythm.   Pulmonary/Chest: Effort normal and breath sounds normal. No stridor. No respiratory distress. She has no wheezes. She has no rhonchi. She has no rales. She exhibits no tenderness.   Abdominal: Normal appearance.   Musculoskeletal: Normal range of motion.         General: Normal range of motion.   Lymphadenopathy:     She has no cervical adenopathy.   Neurological: She is alert and oriented to " person, place, and time.   Skin: Skin is warm, dry and not diaphoretic. Capillary refill takes less than 2 seconds.   Psychiatric: Her behavior is normal. Mood, judgment and thought content normal.   Nursing note and vitals reviewed.      Assessment:     1. Diarrhea, unspecified type    2. Nausea      Plan:   Abdominal exam benign   ER precautions given and discussed  Instructed on stop pain Mounjaro and the potential side effects.  Patient instructed on follow with PCP if she is interested in his medication however adverse reactions such as nausea and diarrhea or, with this medication.  Patient verbalized understanding and agreed with plan of care  Treated symptomatically with Zofran and then encouraged to take 20 30 minutes prior to eating or drinking  Diarrhea, unspecified type  -     POCT Glucose, Hand-Held Device    Nausea  -     POCT Glucose, Hand-Held Device    Other orders  -     ondansetron (ZOFRAN-ODT) 4 MG TbDL; Take 1 tablet (4 mg total) by mouth every 6 (six) hours as needed (nausea).  Dispense: 16 tablet; Refill: 0          Medical Decision Making:   Differential Diagnosis:   Adverse reaction to mounjaro, Electrolyte imbalance, dehydration, viral gastritis

## 2024-11-14 NOTE — PATIENT INSTRUCTIONS
Please follow instructions on patient education material.      Return to urgent care in 2 to 3 days if symptoms are not improving, immediately if you develop any new or worsening symptoms.   STOP Moujaro.   F/u with PCP   GI effects, including abdominal pain, constipation, decreased appetite, diarrhea, dyspepsia, nausea, and vomiting, have been reported with tirz??ati??. Symptoms may sometimes be severe.     Go to the ER if you experience chest pain with shortness of breath, shortness of breath when moving around your house, high fevers 103.0+, excessive vomiting/diarrhea, or severe abdominal pain, general distress.

## 2024-11-21 ENCOUNTER — HOSPITAL ENCOUNTER (EMERGENCY)
Facility: HOSPITAL | Age: 24
Discharge: HOME | End: 2024-11-22
Attending: EMERGENCY MEDICINE
Payer: MEDICAID

## 2024-11-21 DIAGNOSIS — A08.4 VIRAL GASTROENTERITIS: Primary | ICD-10-CM

## 2024-11-21 DIAGNOSIS — K52.1 DIARRHEA DUE TO DRUG: ICD-10-CM

## 2024-11-21 PROCEDURE — 96372 THER/PROPH/DIAG INJ SC/IM: CPT

## 2024-11-21 PROCEDURE — 99284 EMERGENCY DEPT VISIT MOD MDM: CPT | Mod: 25

## 2024-11-21 PROCEDURE — 0240U COVID/FLU A&B PCR: CPT

## 2024-11-21 PROCEDURE — 25000003 PHARM REV CODE 250

## 2024-11-21 PROCEDURE — 63600175 PHARM REV CODE 636 W HCPCS

## 2024-11-21 RX ORDER — ONDANSETRON 4 MG/1
4 TABLET, FILM COATED ORAL ONCE AS NEEDED
Status: COMPLETED | OUTPATIENT
Start: 2024-11-21 | End: 2024-11-21

## 2024-11-21 RX ORDER — DICYCLOMINE HYDROCHLORIDE 10 MG/ML
20 INJECTION INTRAMUSCULAR
Status: COMPLETED | OUTPATIENT
Start: 2024-11-21 | End: 2024-11-21

## 2024-11-21 RX ADMIN — DICYCLOMINE HYDROCHLORIDE 20 MG: 10 INJECTION, SOLUTION INTRAMUSCULAR at 11:11

## 2024-11-21 RX ADMIN — ONDANSETRON HYDROCHLORIDE 4 MG: 4 TABLET, FILM COATED ORAL at 11:11

## 2024-11-21 NOTE — Clinical Note
"Maggi"Florinda Hubbard was seen and treated in our emergency department on 11/21/2024.  She may return to work on 11/24/2024.       If you have any questions or concerns, please don't hesitate to call.       RN    "

## 2024-11-21 NOTE — Clinical Note
"Maggi Hubbard (Sarah) was seen and treated in our emergency department on 11/21/2024.  She may return to work on 11/24/2024.  Patient was seen in our ED and needs to take day off due to symptoms of illness     If you have any questions or concerns, please don't hesitate to call.      Faraz Griffiths MD"

## 2024-11-22 VITALS
BODY MASS INDEX: 45.99 KG/M2 | SYSTOLIC BLOOD PRESSURE: 128 MMHG | HEART RATE: 98 BPM | OXYGEN SATURATION: 100 % | DIASTOLIC BLOOD PRESSURE: 89 MMHG | RESPIRATION RATE: 17 BRPM | HEIGHT: 67 IN | WEIGHT: 293 LBS | TEMPERATURE: 98 F

## 2024-11-22 LAB
FLUAV AG UPPER RESP QL IA.RAPID: NOT DETECTED
FLUBV AG UPPER RESP QL IA.RAPID: NOT DETECTED
SARS-COV-2 RNA RESP QL NAA+PROBE: NOT DETECTED

## 2024-11-22 PROCEDURE — 25000003 PHARM REV CODE 250

## 2024-11-22 RX ORDER — SIMETHICONE 80 MG
80 TABLET,CHEWABLE ORAL EVERY 12 HOURS PRN
Qty: 10 TABLET | Refills: 0 | Status: SHIPPED | OUTPATIENT
Start: 2024-11-22 | End: 2024-11-27

## 2024-11-22 RX ORDER — SIMETHICONE 80 MG
1 TABLET,CHEWABLE ORAL
Status: COMPLETED | OUTPATIENT
Start: 2024-11-22 | End: 2024-11-22

## 2024-11-22 RX ORDER — DICYCLOMINE HYDROCHLORIDE 20 MG/1
20 TABLET ORAL 2 TIMES DAILY
Qty: 10 TABLET | Refills: 0 | Status: SHIPPED | OUTPATIENT
Start: 2024-11-22 | End: 2024-11-27

## 2024-11-22 RX ADMIN — SIMETHICONE 80 MG: 80 TABLET, CHEWABLE ORAL at 12:11

## 2024-11-22 NOTE — ED PROVIDER NOTES
Encounter Date: 11/21/2024       History     Chief Complaint   Patient presents with    Hematemesis     N&V bright red blood x2 [place] movie theater. Diarrhea x2 weeks     24 year old female with PMHx ADHD presents due to x1 episode of bloody vomitus 2 hours prior to evaluation. Patient reports that for the past 10 days, she has had watery diarrhea 3-4x per day and has only used imodium with mild relief. Over past day, she has developed one episode of vomiting of mainly food contents, which was followed by small amount of blood 2-3 tea spoons in amount. Patient has never had similar occurrence and reports she has no hx of ulcers, blood thinners, abnormal bleeding, or blood in stool or urine. Patient reportedly took mounjaro from a family member 3 days before start of GI symptoms, which was an increased dose from the dose she is used to, and has been having these symptoms since. Denies sick contacts or suspicious food ingestion. Denies fever/chills, SOB, ch pain, sore throat.    The history is provided by the patient. No  was used.     Review of patient's allergies indicates:  No Known Allergies  Past Medical History:   Diagnosis Date    ADHD     Anxiety     Depression      History reviewed. No pertinent surgical history.  Family History   Problem Relation Name Age of Onset    Mental illness Mother      Hypertension Father       Social History     Tobacco Use    Smoking status: Never     Passive exposure: Never    Smokeless tobacco: Never    Tobacco comments:     Smoked marijuana   Substance Use Topics    Alcohol use: Not Currently     Comment: once a month    Drug use: Never     Review of Systems   Constitutional:  Negative for activity change, appetite change, chills, fatigue, fever and unexpected weight change.   HENT:  Negative for congestion.    Respiratory:  Negative for cough, chest tightness, shortness of breath and wheezing.    Cardiovascular:  Negative for chest pain, palpitations and  leg swelling.   Gastrointestinal:  Positive for abdominal pain, diarrhea, nausea and vomiting. Negative for blood in stool.   Genitourinary:  Negative for decreased urine volume, dysuria, flank pain, hematuria and vaginal bleeding.   Musculoskeletal:  Positive for back pain. Negative for arthralgias and myalgias.   Hematological:  Negative for adenopathy.       Physical Exam     Initial Vitals [11/21/24 2241]   BP Pulse Resp Temp SpO2   (!) 161/91 62 20 97.7 °F (36.5 °C) 96 %      MAP       --         Physical Exam    Nursing note and vitals reviewed.  Constitutional: She appears well-developed and well-nourished. She is not diaphoretic. No distress.   HENT:   Head: Normocephalic and atraumatic.   Nose: Nose normal. Mouth/Throat: Oropharynx is clear and moist. No oropharyngeal exudate.   Eyes: Conjunctivae and EOM are normal. Pupils are equal, round, and reactive to light. Right eye exhibits no discharge. Left eye exhibits no discharge. No scleral icterus.   Neck: No thyromegaly present.   Normal range of motion.  Cardiovascular:  Normal rate, regular rhythm, normal heart sounds and intact distal pulses.           No murmur heard.  Pulmonary/Chest: Breath sounds normal. No respiratory distress. She has no wheezes.   Abdominal: Abdomen is soft. Bowel sounds are normal. She exhibits no distension. There is no abdominal tenderness.   Musculoskeletal:         General: No tenderness or edema. Normal range of motion.      Cervical back: Normal range of motion.     Lymphadenopathy:     She has no cervical adenopathy.   Neurological: She is alert and oriented to person, place, and time. She has normal strength. GCS score is 15. GCS eye subscore is 4. GCS verbal subscore is 5. GCS motor subscore is 6.   Skin: Skin is warm and dry. Capillary refill takes less than 2 seconds. No erythema.   Psychiatric: She has a normal mood and affect. Thought content normal.         ED Course   Procedures  Labs Reviewed   COVID/FLU A&B PCR -  Normal       Result Value    Influenza A PCR Not Detected      Influenza B PCR Not Detected      SARS-CoV-2 PCR Not Detected      Narrative:     The Xpert Xpress SARS-CoV-2/FLU/RSV plus is a rapid, multiplexed real-time PCR test intended for the simultaneous qualitative detection and differentiation of SARS-CoV-2, Influenza A, Influenza B, and respiratory syncytial virus (RSV) viral RNA in either nasopharyngeal swab or nasal swab specimens.                Imaging Results    None          Medications   ondansetron tablet 4 mg (4 mg Oral Given 11/21/24 2319)   dicyclomine injection 20 mg (20 mg Intramuscular Given 11/21/24 2319)   simethicone chewable tablet 80 mg (80 mg Oral Given 11/22/24 0029)     Medical Decision Making  Patient with GI symptoms and x1 episode of blood in vomitus, small amount, likely secondary to esophageal irritation. Symptoms preceded by increase in mounjaro dose, likely cause. In addition, testing patient to rule out flu/covid, negative; will give symptomatic relief with bentyl and zofran. Will discharge afterwards, VSS, HDS. Advised patient to see PCP for further advice about mounjaro use, but to stop for now, and to not take higher dose for now to allow washout of medication and cessation of symptoms. Patient has no established PCP, so will refer to our clinic and instructed to inquire about mounjaro use.    Amount and/or Complexity of Data Reviewed  Labs: ordered. Decision-making details documented in ED Course.    Risk  OTC drugs.  Prescription drug management.      Additional MDM:   Differential Diagnosis:   Other: The following diagnoses were also considered and will be evaluated: iatrogenic diarrhea, viral gastroenteritis and ngoc-kline tear.                                   Clinical Impression:  Final diagnoses:  [A08.4] Viral gastroenteritis (Primary)          ED Disposition Condition    Discharge Stable          ED Prescriptions       Medication Sig Dispense Start Date End Date  Auth. Provider    dicyclomine (BENTYL) 20 mg tablet Take 1 tablet (20 mg total) by mouth 2 (two) times daily. for 5 days 10 tablet 11/22/2024 11/27/2024 Diane Johnson MD    simethicone (MYLICON) 80 MG chewable tablet Take 1 tablet (80 mg total) by mouth every 12 (twelve) hours as needed for Flatulence. 10 tablet 11/22/2024 11/27/2024 Diane Johnson MD          Follow-up Information       Follow up With Specialties Details Why Contact Info Additional Information    [facility] Emergency Medicine Go to  As needed, If symptoms worsen [address] [facility] [address] [phone]     [facility] Internal Medicine Schedule an appointment as soon as possible for a visit  As needed, If symptoms worsen [address] [facility] [address] [phone] Internal Medicine Clinic Entrance #1             Diane Johnson MD  Resident  11/22/24 0055       Diane Johnson MD  Resident  11/22/24 0101

## 2025-03-16 ENCOUNTER — HOSPITAL ENCOUNTER (EMERGENCY)
Facility: HOSPITAL | Age: 25
Discharge: HOME | End: 2025-03-17
Attending: INTERNAL MEDICINE
Payer: MEDICAID

## 2025-03-16 VITALS
HEART RATE: 80 BPM | TEMPERATURE: 99 F | WEIGHT: 293 LBS | SYSTOLIC BLOOD PRESSURE: 132 MMHG | DIASTOLIC BLOOD PRESSURE: 79 MMHG | BODY MASS INDEX: 45.99 KG/M2 | OXYGEN SATURATION: 100 % | HEIGHT: 67 IN | RESPIRATION RATE: 17 BRPM

## 2025-03-16 DIAGNOSIS — R07.89 ATYPICAL CHEST PAIN: Primary | ICD-10-CM

## 2025-03-16 DIAGNOSIS — R00.2 HEART PALPITATIONS: ICD-10-CM

## 2025-03-16 LAB
ALBUMIN SERPL-MCNC: 3.9 G/DL (ref 3.5–5)
ALBUMIN/GLOB SERPL: 0.9 RATIO (ref 1.1–2)
ALP SERPL-CCNC: 97 UNIT/L (ref 40–150)
ALT SERPL-CCNC: 27 UNIT/L (ref 0–55)
ANION GAP SERPL CALC-SCNC: 11 MEQ/L
AST SERPL-CCNC: 20 UNIT/L (ref 5–34)
BASOPHILS # BLD AUTO: 0.06 X10(3)/MCL
BASOPHILS NFR BLD AUTO: 0.5 %
BILIRUB SERPL-MCNC: 0.3 MG/DL
BUN SERPL-MCNC: 9.6 MG/DL (ref 7–18.7)
CALCIUM SERPL-MCNC: 10.2 MG/DL (ref 8.4–10.2)
CHLORIDE SERPL-SCNC: 107 MMOL/L (ref 98–107)
CO2 SERPL-SCNC: 21 MMOL/L (ref 22–29)
CREAT SERPL-MCNC: 0.79 MG/DL (ref 0.55–1.02)
CREAT/UREA NIT SERPL: 12
D DIMER PPP IA.FEU-MCNC: 0.42 UG/ML FEU (ref 0–0.5)
EOSINOPHIL # BLD AUTO: 0.34 X10(3)/MCL (ref 0–0.9)
EOSINOPHIL NFR BLD AUTO: 3 %
ERYTHROCYTE [DISTWIDTH] IN BLOOD BY AUTOMATED COUNT: 12.4 % (ref 11.5–17)
GFR SERPLBLD CREATININE-BSD FMLA CKD-EPI: >60 ML/MIN/1.73/M2
GLOBULIN SER-MCNC: 4.4 GM/DL (ref 2.4–3.5)
GLUCOSE SERPL-MCNC: 78 MG/DL (ref 74–100)
HCT VFR BLD AUTO: 41.6 % (ref 37–47)
HGB BLD-MCNC: 13.6 G/DL (ref 12–16)
IMM GRANULOCYTES # BLD AUTO: 0.02 X10(3)/MCL (ref 0–0.04)
IMM GRANULOCYTES NFR BLD AUTO: 0.2 %
LYMPHOCYTES # BLD AUTO: 3.89 X10(3)/MCL (ref 0.6–4.6)
LYMPHOCYTES NFR BLD AUTO: 34.6 %
MCH RBC QN AUTO: 28.6 PG (ref 27–31)
MCHC RBC AUTO-ENTMCNC: 32.7 G/DL (ref 33–36)
MCV RBC AUTO: 87.6 FL (ref 80–94)
MONOCYTES # BLD AUTO: 1.27 X10(3)/MCL (ref 0.1–1.3)
MONOCYTES NFR BLD AUTO: 11.3 %
NEUTROPHILS # BLD AUTO: 5.65 X10(3)/MCL (ref 2.1–9.2)
NEUTROPHILS NFR BLD AUTO: 50.4 %
NRBC BLD AUTO-RTO: 0 %
PLATELET # BLD AUTO: 329 X10(3)/MCL (ref 130–400)
PMV BLD AUTO: 10.4 FL (ref 7.4–10.4)
POTASSIUM SERPL-SCNC: 4.1 MMOL/L (ref 3.5–5.1)
PROT SERPL-MCNC: 8.3 GM/DL (ref 6.4–8.3)
RBC # BLD AUTO: 4.75 X10(6)/MCL (ref 4.2–5.4)
SODIUM SERPL-SCNC: 139 MMOL/L (ref 136–145)
TSH SERPL-ACNC: 3.48 UIU/ML (ref 0.35–4.94)
WBC # BLD AUTO: 11.23 X10(3)/MCL (ref 4.5–11.5)

## 2025-03-16 PROCEDURE — 93005 ELECTROCARDIOGRAM TRACING: CPT

## 2025-03-16 PROCEDURE — 99285 EMERGENCY DEPT VISIT HI MDM: CPT | Mod: 25

## 2025-03-16 PROCEDURE — 85025 COMPLETE CBC W/AUTO DIFF WBC: CPT | Performed by: INTERNAL MEDICINE

## 2025-03-16 PROCEDURE — 85379 FIBRIN DEGRADATION QUANT: CPT | Performed by: INTERNAL MEDICINE

## 2025-03-16 PROCEDURE — 80053 COMPREHEN METABOLIC PANEL: CPT | Performed by: INTERNAL MEDICINE

## 2025-03-16 PROCEDURE — 84443 ASSAY THYROID STIM HORMONE: CPT | Performed by: INTERNAL MEDICINE

## 2025-03-16 NOTE — Clinical Note
"Maggi"Florinda Hubbard was seen and treated in our emergency department on 3/16/2025.  She may return to work on 03/17/2025.       If you have any questions or concerns, please don't hesitate to call.       RN    "

## 2025-03-17 LAB — HOLD SPECIMEN: NORMAL

## 2025-03-17 NOTE — ED PROVIDER NOTES
Encounter Date: 3/16/2025       History     Chief Complaint   Patient presents with    Palpitations    Chest Pain     Pt c/o intermittent heart palpitations and chest pressure. Pt states that she recently went to her primary care physician who stated at the time that her resting heart rate was in the 120s. She was recently started on Metoprolol and started taking it on Friday.      Presents with chest pain associated to palpitations. States recently started in Metoprolol due to tachycardia. Was using Cymbalta, Abilify and Methylphenidate for ADHD and Depression until Nov 2024. Denies hemoptysis, swelling, prior Hx of DVT/PE, vomiting, fever or diaphoresis    The history is provided by the patient.     Review of patient's allergies indicates:  No Known Allergies  Past Medical History:   Diagnosis Date    ADHD     Anxiety     Depression      History reviewed. No pertinent surgical history.  Family History   Problem Relation Name Age of Onset    Mental illness Mother      Hypertension Father       Social History[1]  Review of Systems   Cardiovascular:  Positive for chest pain and palpitations.       Physical Exam     Initial Vitals [03/16/25 2201]   BP Pulse Resp Temp SpO2   (!) 158/97 88 17 98.4 °F (36.9 °C) 99 %      MAP       --         Physical Exam    Nursing note and vitals reviewed.  Constitutional: She appears well-developed and well-nourished. No distress.   HENT:   Head: Normocephalic and atraumatic. Mouth/Throat: Oropharynx is clear and moist.   Eyes: Conjunctivae are normal. Pupils are equal, round, and reactive to light.   Neck: Neck supple. No thyromegaly present. No JVD present.   Normal range of motion.  Cardiovascular:  Normal rate, regular rhythm, normal heart sounds and intact distal pulses.           Pulmonary/Chest: Breath sounds normal.   Abdominal: Abdomen is soft. Bowel sounds are normal. She exhibits no distension. There is no abdominal tenderness. There is no rebound and no guarding.    Musculoskeletal:         General: No edema. Normal range of motion.      Cervical back: Normal range of motion and neck supple.     Neurological: She is alert and oriented to person, place, and time. She has normal strength. GCS score is 15. GCS eye subscore is 4. GCS verbal subscore is 5. GCS motor subscore is 6.   Skin: Skin is warm and dry. No rash noted.   Psychiatric: Her behavior is normal.         ED Course   Procedures  Labs Reviewed   COMPREHENSIVE METABOLIC PANEL - Abnormal       Result Value    Sodium 139      Potassium 4.1      Chloride 107      CO2 21 (*)     Glucose 78      Blood Urea Nitrogen 9.6      Creatinine 0.79      Calcium 10.2      Protein Total 8.3      Albumin 3.9      Globulin 4.4 (*)     Albumin/Globulin Ratio 0.9 (*)     Bilirubin Total 0.3      ALP 97      ALT 27      AST 20      eGFR >60      Anion Gap 11.0      BUN/Creatinine Ratio 12     CBC WITH DIFFERENTIAL - Abnormal    WBC 11.23      RBC 4.75      Hgb 13.6      Hct 41.6      MCV 87.6      MCH 28.6      MCHC 32.7 (*)     RDW 12.4      Platelet 329      MPV 10.4      Neut % 50.4      Lymph % 34.6      Mono % 11.3      Eos % 3.0      Basophil % 0.5      Imm Grans % 0.2      Neut # 5.65      Lymph # 3.89      Mono # 1.27      Eos # 0.34      Baso # 0.06      Imm Gran # 0.02      NRBC% 0.0     D DIMER, QUANTITATIVE - Normal    D-Dimer 0.42     TSH - Normal    TSH 3.483     CBC W/ AUTO DIFFERENTIAL    Narrative:     The following orders were created for panel order CBC auto differential.  Procedure                               Abnormality         Status                     ---------                               -----------         ------                     CBC with Differential[[id number]]       Abnormal            Final result                 Please view results for these tests on the individual orders.   EXTRA TUBES    Narrative:     The following orders were created for panel order EXTRA TUBES.  Procedure                                Abnormality         Status                     ---------                               -----------         ------                     Lavender Top Hold[[id number]]                                                          Gold Top Hold[[id number]]                                                                Please view results for these tests on the individual orders.   LAVENDER TOP HOLD   GOLD TOP HOLD   EXTRA TUBES    Narrative:     The following orders were created for panel order EXTRA TUBES.  Procedure                               Abnormality         Status                     ---------                               -----------         ------                     Light Green Top Hold[[id number]]                            In process                   Please view results for these tests on the individual orders.   LIGHT GREEN TOP HOLD     EKG Readings: (Independently Interpreted)   Initial Reading: No STEMI. Heart Rate: 84. Axis: Normal.       Imaging Results              X-Ray Chest PA And Lateral (Final result)  Result time 03/16/25 22:50:38      Final result by Aaron Aviles MD (03/16/25 22:50:38)                   Impression:      No acute abnormality.      Electronically signed by: Aaron Aviles MD  Date:    03/16/2025  Time:    22:50               Narrative:    EXAMINATION:  XR CHEST PA AND LATERAL    CLINICAL HISTORY:  Palpitations    TECHNIQUE:  PA and lateral views of the chest were performed.    COMPARISON:  None    FINDINGS:  The lungs are clear, with normal appearance of pulmonary vasculature and no pleural effusion or pneumothorax.    The cardiac silhouette is normal in size. The hilar and mediastinal contours are unremarkable.    Bones are intact.                                       Medications - No data to display  Medical Decision Making  Amount and/or Complexity of Data Reviewed  Labs: ordered. Decision-making details documented in ED Course.  Radiology: ordered and independent  interpretation performed. Decision-making details documented in ED Course.  ECG/medicine tests: ordered and independent interpretation performed. Decision-making details documented in ED Course.      Additional MDM:   Differential Diagnosis:   Paroxysmal A Fib, WPW, Hyperthyroidism, Drug reaction, MI, among others                                      Clinical Impression:  Final diagnoses:  [R00.2] Heart palpitations  [R07.89] Atypical chest pain (Primary)          ED Disposition Condition    Discharge Stable          ED Prescriptions    None       Follow-up Information       Follow up With Specialties Details Why Contact Info    [facility] Emergency Medicine  If symptoms worsen [address] [facility] [address] [phone]    Your Primary care Provider  In 1 week                   [1]   Social History  Tobacco Use    Smoking status: Never     Passive exposure: Never    Smokeless tobacco: Never    Tobacco comments:     Smoked marijuana   Substance Use Topics    Alcohol use: Not Currently     Comment: once a month    Drug use: Never        Louie Pacheco MD  03/16/25 4032

## 2025-03-18 LAB
OHS QRS DURATION: 86 MS
OHS QTC CALCULATION: 420 MS

## 2025-03-29 ENCOUNTER — CLINICAL SUPPORT (OUTPATIENT)
Dept: URGENT CARE | Facility: CLINIC | Age: 25
End: 2025-03-29
Payer: MEDICAID

## 2025-03-29 DIAGNOSIS — Z23 FLU VACCINE NEED: Primary | ICD-10-CM

## 2025-03-29 PROCEDURE — 90471 IMMUNIZATION ADMIN: CPT | Mod: ,,, | Performed by: FAMILY MEDICINE

## 2025-03-29 PROCEDURE — 90656 IIV3 VACC NO PRSV 0.5 ML IM: CPT | Mod: ,,, | Performed by: FAMILY MEDICINE

## 2025-03-29 NOTE — PROGRESS NOTES
Patient presented to clinic for influenza vaccination. Vaccine information sheet given to patient along with written consent form. After written consent was obtained, patient given influenza vaccine in Right deltoid. Patient tolerated vaccine well.

## 2025-04-07 ENCOUNTER — OFFICE VISIT (OUTPATIENT)
Dept: URGENT CARE | Facility: CLINIC | Age: 25
End: 2025-04-07
Payer: MEDICAID

## 2025-04-07 VITALS
TEMPERATURE: 99 F | WEIGHT: 293 LBS | DIASTOLIC BLOOD PRESSURE: 84 MMHG | HEART RATE: 91 BPM | SYSTOLIC BLOOD PRESSURE: 135 MMHG | OXYGEN SATURATION: 99 % | BODY MASS INDEX: 45.99 KG/M2 | RESPIRATION RATE: 18 BRPM | HEIGHT: 67 IN

## 2025-04-07 DIAGNOSIS — N92.6 MISSED PERIOD: Primary | ICD-10-CM

## 2025-04-07 DIAGNOSIS — N92.6 IRREGULAR MENSES: ICD-10-CM

## 2025-04-07 LAB
B-HCG UR QL: NEGATIVE
BILIRUBIN, UA POC OHS: NEGATIVE
BLOOD, UA POC OHS: NEGATIVE
CLARITY, UA POC OHS: CLEAR
COLOR, UA POC OHS: YELLOW
CTP QC/QA: YES
GLUCOSE, UA POC OHS: NEGATIVE
KETONES, UA POC OHS: NEGATIVE
LEUKOCYTES, UA POC OHS: NEGATIVE
NITRITE, UA POC OHS: NEGATIVE
PH, UA POC OHS: 7
PROTEIN, UA POC OHS: NEGATIVE
SPECIFIC GRAVITY, UA POC OHS: 1.02
UROBILINOGEN, UA POC OHS: 0.2

## 2025-04-07 PROCEDURE — 81025 URINE PREGNANCY TEST: CPT | Mod: S$GLB,,, | Performed by: FAMILY MEDICINE

## 2025-04-07 PROCEDURE — 99214 OFFICE O/P EST MOD 30 MIN: CPT | Mod: S$GLB,,, | Performed by: FAMILY MEDICINE

## 2025-04-07 PROCEDURE — 81003 URINALYSIS AUTO W/O SCOPE: CPT | Mod: QW,S$GLB,, | Performed by: FAMILY MEDICINE

## 2025-04-07 RX ORDER — METOPROLOL SUCCINATE 25 MG/1
25 TABLET, EXTENDED RELEASE ORAL
COMMUNITY
Start: 2025-03-12

## 2025-04-07 RX ORDER — ERGOCALCIFEROL 1.25 MG/1
50000 CAPSULE ORAL
COMMUNITY
Start: 2025-04-07

## 2025-04-07 RX ORDER — PANTOPRAZOLE SODIUM 40 MG/1
40 TABLET, DELAYED RELEASE ORAL
COMMUNITY
Start: 2025-03-12

## 2025-04-07 NOTE — PROGRESS NOTES
"Subjective:      Patient ID: Maggi Hubbard is a 24 y.o. female.    Vitals:  height is 5' 6.93" (1.7 m) and weight is 165 kg (363 lb 12.1 oz) (abnormal). Her oral temperature is 98.6 °F (37 °C). Her blood pressure is 135/84 and her pulse is 91. Her respiration is 18 and oxygen saturation is 99%.     Chief Complaint: Amenorrhea    Patient states that her last cycle was on February 5,2025 ,she states that she has had irregular periods before but never this late,she's taken 5 over the counter pregnancy test and they all came back negative.     Other  This is a new problem. The current episode started more than 1 month ago. Nothing aggravates the symptoms. She has tried nothing for the symptoms. The treatment provided no relief.       Constitution: Negative.   HENT: Negative.     Cardiovascular: Negative.    Eyes: Negative.    Respiratory: Negative.     Gastrointestinal: Negative.    Endocrine: negative.   Genitourinary: Negative.    Musculoskeletal: Negative.    Skin: Negative.    Allergic/Immunologic: Negative.    Neurological: Negative.    Hematologic/Lymphatic: Negative.    Psychiatric/Behavioral: Negative.        Objective:     Physical Exam   Constitutional: She is oriented to person, place, and time. She appears well-developed. She is cooperative.   HENT:   Head: Normocephalic and atraumatic.   Ears:   Right Ear: Hearing, tympanic membrane, external ear and ear canal normal.   Left Ear: Hearing, tympanic membrane, external ear and ear canal normal.   Nose: Nose normal. No mucosal edema or nasal deformity. No epistaxis. Right sinus exhibits no maxillary sinus tenderness and no frontal sinus tenderness. Left sinus exhibits no maxillary sinus tenderness and no frontal sinus tenderness.   Mouth/Throat: Uvula is midline, oropharynx is clear and moist and mucous membranes are normal. No trismus in the jaw. Normal dentition. No uvula swelling.   Eyes: Conjunctivae and lids are normal.   Neck: Trachea normal and phonation " normal. Neck supple.   Cardiovascular: Normal rate, regular rhythm, normal heart sounds and normal pulses.   Pulmonary/Chest: Effort normal and breath sounds normal.   Abdominal: Normal appearance and bowel sounds are normal. Soft.   Musculoskeletal: Normal range of motion.         General: Normal range of motion.   Neurological: She is alert and oriented to person, place, and time. She exhibits normal muscle tone.   Skin: Skin is warm, dry and intact.   Psychiatric: Her speech is normal and behavior is normal. Judgment and thought content normal.   Nursing note and vitals reviewed.    Results for orders placed or performed in visit on 04/07/25   POCT Urinalysis(Instrument)    Collection Time: 04/07/25  5:43 PM   Result Value Ref Range    Color, POC UA Yellow Yellow, Straw, Colorless    Clarity, POC UA Clear Clear    Glucose, POC UA Negative Negative    Bilirubin, POC UA Negative Negative    Ketones, POC UA Negative Negative    Spec Grav POC UA 1.020 1.005 - 1.030    Blood, POC UA Negative Negative    pH, POC UA 7.0 5.0 - 8.0    Protein, POC UA Negative Negative    Urobilinogen, POC UA 0.2 <=1.0    Nitrite, POC UA Negative Negative    WBC, POC UA Negative Negative   POCT urine pregnancy    Collection Time: 04/07/25  5:44 PM   Result Value Ref Range    POC Preg Test, Ur Negative Negative     Acceptable Yes          Assessment:     1. Missed period    2. Irregular menses        Plan:       Missed period  -     POCT Urinalysis(Instrument)  -     POCT urine pregnancy    Irregular menses      Please drink plenty of fluids.  Please get plenty of rest.  Please return here or go to the Emergency Department for any concerns or worsening of condition.  If you were given wait & see antibiotics, please wait 3-5 days before taking them, and only take them if your symptoms have worsened or not improved.  If you do begin taking the antibiotics, please take them to completion.  If you were prescribed antibiotics,  please take them to completion.  If you were prescribed a narcotic medication, do not drive or operate heavy equipment or machinery while taking these medications.      If not allergic, please take over the counter Tylenol (Acetaminophen) and/or Motrin (Ibuprofen) as directed for control of pain and/or fever.    Please follow up with your primary care doctor or specialist as needed.  No primary care provider on file.  None    You must understand that you have received treatment at an Urgent Care facility only, and that you may be  released before all of your medical problems are known or treated. Urgent Care facilities are not equipped to  handle life threatening emergencies. It is recommended that you seek care at an Emergency Department for  further evaluation of worsening or concerning symptoms, or possibly life threatening conditions as  discussed.

## 2025-04-07 NOTE — PATIENT INSTRUCTIONS
Please drink plenty of fluids.  Please get plenty of rest.  Please return here or go to the Emergency Department for any concerns or worsening of condition.  If you were given wait & see antibiotics, please wait 3-5 days before taking them, and only take them if your symptoms have worsened or not improved.  If you do begin taking the antibiotics, please take them to completion.  If you were prescribed antibiotics, please take them to completion.  If you were prescribed a narcotic medication, do not drive or operate heavy equipment or machinery while taking these medications.      If not allergic, please take over the counter Tylenol (Acetaminophen) and/or Motrin (Ibuprofen) as directed for control of pain and/or fever.    Please follow up with your primary care doctor or specialist as needed.  No primary care provider on file.  None    You must understand that you have received treatment at an Urgent Care facility only, and that you may be  released before all of your medical problems are known or treated. Urgent Care facilities are not equipped to  handle life threatening emergencies. It is recommended that you seek care at an Emergency Department for  further evaluation of worsening or concerning symptoms, or possibly life threatening conditions as  discussed.

## 2025-04-07 NOTE — LETTER
April 7, 2025  Maggi Hubbard  [address] [address]                [facility]  [address], [address]  [facility] [address]  Phone: [phone]  Fax: [phone] Maggi Hubbard was seen and treated in our Urgent Care department on 4/7/2025. She may return to work in 2 - 3 days.      If you have any questions or concerns, please don't hesitate to call.        Sincerely,        Jaren Albarado MD

## 2025-05-01 ENCOUNTER — OFFICE VISIT (OUTPATIENT)
Dept: OBSTETRICS AND GYNECOLOGY | Facility: CLINIC | Age: 25
End: 2025-05-01
Payer: MEDICAID

## 2025-05-01 ENCOUNTER — LAB VISIT (OUTPATIENT)
Dept: LAB | Facility: HOSPITAL | Age: 25
End: 2025-05-01
Attending: OBSTETRICS & GYNECOLOGY
Payer: MEDICAID

## 2025-05-01 VITALS
BODY MASS INDEX: 47.09 KG/M2 | SYSTOLIC BLOOD PRESSURE: 122 MMHG | HEART RATE: 104 BPM | HEIGHT: 66 IN | DIASTOLIC BLOOD PRESSURE: 71 MMHG | WEIGHT: 293 LBS

## 2025-05-01 DIAGNOSIS — N92.6 IRREGULAR MENSES: ICD-10-CM

## 2025-05-01 DIAGNOSIS — N91.2 AMENORRHEA: ICD-10-CM

## 2025-05-01 DIAGNOSIS — N91.2 AMENORRHEA: Primary | ICD-10-CM

## 2025-05-01 LAB
HCG INTACT+B SERPL-ACNC: <2.42 MIU/ML
PROLACTIN SERPL IA-MCNC: 17.3 NG/ML (ref 5.2–26.5)

## 2025-05-01 PROCEDURE — 99213 OFFICE O/P EST LOW 20 MIN: CPT | Mod: PBBFAC | Performed by: OBSTETRICS & GYNECOLOGY

## 2025-05-01 PROCEDURE — 84146 ASSAY OF PROLACTIN: CPT

## 2025-05-01 PROCEDURE — 36415 COLL VENOUS BLD VENIPUNCTURE: CPT

## 2025-05-01 PROCEDURE — 1159F MED LIST DOCD IN RCRD: CPT | Mod: CPTII,,, | Performed by: OBSTETRICS & GYNECOLOGY

## 2025-05-01 PROCEDURE — 3074F SYST BP LT 130 MM HG: CPT | Mod: CPTII,,, | Performed by: OBSTETRICS & GYNECOLOGY

## 2025-05-01 PROCEDURE — 3078F DIAST BP <80 MM HG: CPT | Mod: CPTII,,, | Performed by: OBSTETRICS & GYNECOLOGY

## 2025-05-01 PROCEDURE — 84702 CHORIONIC GONADOTROPIN TEST: CPT

## 2025-05-01 PROCEDURE — 99999 PR PBB SHADOW E&M-EST. PATIENT-LVL III: CPT | Mod: PBBFAC,,, | Performed by: OBSTETRICS & GYNECOLOGY

## 2025-05-01 PROCEDURE — 99203 OFFICE O/P NEW LOW 30 MIN: CPT | Mod: S$PBB,,, | Performed by: OBSTETRICS & GYNECOLOGY

## 2025-05-01 PROCEDURE — 3008F BODY MASS INDEX DOCD: CPT | Mod: CPTII,,, | Performed by: OBSTETRICS & GYNECOLOGY

## 2025-05-01 PROCEDURE — 1160F RVW MEDS BY RX/DR IN RCRD: CPT | Mod: CPTII,,, | Performed by: OBSTETRICS & GYNECOLOGY

## 2025-05-01 RX ORDER — DROSPIRENONE AND ETHINYL ESTRADIOL 0.02-3(28)
1 KIT ORAL DAILY
Qty: 28 TABLET | Refills: 11 | Status: SHIPPED | OUTPATIENT
Start: 2025-05-01 | End: 2026-05-01

## 2025-05-01 NOTE — PROGRESS NOTES
Subjective:    Patient ID: Maggi Hubbard is a 24 y.o. y.o. female    Chief Complaint:   Chief Complaint   Patient presents with    abnormal menses     States no cycle in 2 months with negative UPT. Has had late cycles before but never this long.        History of Present Illness:  Maggi presents today for discussion of issues with menses. She reports that she has had no cycle in 2 months.  She does report that she has had some irregular cycles in the past but never gone this long between them.  She states that pregnancy test at home has been negative.  No nausea vomiting or breast tenderness noted.    She has history of sleep apnea and prediabetes.  She is ultimately trying to obtain bariatric surgery      Review of Systems   Constitutional:  Negative for chills and fever.   Respiratory:  Negative for shortness of breath.    Cardiovascular:  Negative for chest pain.   Gastrointestinal:  Negative for constipation.   Genitourinary:  Negative for dysuria, menstrual problem and pelvic pain.   Neurological:  Negative for headaches.         Objective:    Vital Signs:  Vitals:    05/01/25 1454   BP: 122/71   Pulse: 104     Wt Readings from Last 1 Encounters:   05/01/25 (!) 166 kg (366 lb)     Body mass index is 59.07 kg/m².    Physical Exam:  General:  alert, no distress   Skin:  Skin color, texture, turgor normal. No rashes or lesions   Abdomen:  Soft, obese, nontender   Extremities: No cyanosis, clubbing, edema       Will obtain labs to determine any again it causes for her irregular cycle.  Recommend she start birth control to help with regulation.  All questions answered    I spent a total of 30 minutes on the day of the visit.  This includes face to face time and non-face to face time preparing to see the patient (eg, review of tests), obtaining and/or reviewing separately obtained history, documenting clinical information in the electronic or other health record, independently interpreting results and communicating  results to the patient/family/caregiver, or care coordinator.         Assessment:      1. Amenorrhea    2. Irregular menses          Plan:      Amenorrhea  -     Prolactin; Future; Expected date: 05/01/2025  -     HCG, Quantitative; Future; Expected date: 05/01/2025    Irregular menses  -     Prolactin; Future; Expected date: 05/01/2025  -     HCG, Quantitative; Future; Expected date: 05/01/2025  -     drospirenone-ethinyl estradioL (PARIS) 3-0.02 mg per tablet; Take 1 tablet by mouth once daily.  Dispense: 28 tablet; Refill: 11           Ness Yanes MD, FACOG   05/01/2025 3:33 PM

## 2025-05-02 ENCOUNTER — RESULTS FOLLOW-UP (OUTPATIENT)
Dept: OBSTETRICS AND GYNECOLOGY | Facility: HOSPITAL | Age: 25
End: 2025-05-02
Payer: MEDICAID

## 2025-06-29 NOTE — TELEPHONE ENCOUNTER
----- Message from ROSINA Vela sent at 11/8/2024 12:32 PM CST -----  Normal findings, no change in treatment plan.   
Results viewed by patient via patient portal.   
abscess

## 2025-07-16 ENCOUNTER — OFFICE VISIT (OUTPATIENT)
Dept: URGENT CARE | Facility: CLINIC | Age: 25
End: 2025-07-16
Payer: MEDICAID

## 2025-07-16 VITALS
SYSTOLIC BLOOD PRESSURE: 139 MMHG | OXYGEN SATURATION: 97 % | BODY MASS INDEX: 47.09 KG/M2 | WEIGHT: 293 LBS | HEIGHT: 66 IN | TEMPERATURE: 98 F | RESPIRATION RATE: 18 BRPM | HEART RATE: 95 BPM | DIASTOLIC BLOOD PRESSURE: 84 MMHG

## 2025-07-16 DIAGNOSIS — J03.90 TONSILLITIS: Primary | ICD-10-CM

## 2025-07-16 DIAGNOSIS — R09.89 SYMPTOMS OF UPPER RESPIRATORY INFECTION (URI): ICD-10-CM

## 2025-07-16 DIAGNOSIS — J02.9 SORE THROAT: ICD-10-CM

## 2025-07-16 LAB
CTP QC/QA: YES
CTP QC/QA: YES
MOLECULAR STREP A: NEGATIVE
SARS-COV+SARS-COV-2 AG RESP QL IA.RAPID: NEGATIVE

## 2025-07-16 PROCEDURE — 99214 OFFICE O/P EST MOD 30 MIN: CPT | Mod: S$PBB,,,

## 2025-07-16 PROCEDURE — 87651 STREP A DNA AMP PROBE: CPT | Mod: PBBFAC

## 2025-07-16 PROCEDURE — 99214 OFFICE O/P EST MOD 30 MIN: CPT | Mod: PBBFAC

## 2025-07-16 PROCEDURE — 87811 SARS-COV-2 COVID19 W/OPTIC: CPT | Mod: PBBFAC

## 2025-07-16 RX ORDER — PHENOL 1.4 %
AEROSOL, SPRAY (ML) MUCOUS MEMBRANE
Qty: 177 ML | Refills: 0 | Status: SHIPPED | OUTPATIENT
Start: 2025-07-16 | End: 2025-07-26

## 2025-07-16 RX ORDER — METFORMIN HYDROCHLORIDE 500 MG/1
TABLET ORAL
COMMUNITY
Start: 2025-06-03

## 2025-07-16 RX ORDER — AMOXICILLIN AND CLAVULANATE POTASSIUM 875; 125 MG/1; MG/1
1 TABLET, FILM COATED ORAL EVERY 12 HOURS
Qty: 14 TABLET | Refills: 0 | Status: SHIPPED | OUTPATIENT
Start: 2025-07-16 | End: 2025-07-23

## 2025-07-16 NOTE — PROGRESS NOTES
"Subjective:       Patient ID: Maggi Hubbard is a 25 y.o. female.    Vitals:  height is 5' 6" (1.676 m) and weight is 164.3 kg (362 lb 3.2 oz) (abnormal). Her temperature is 98.2 °F (36.8 °C). Her blood pressure is 139/84 and her pulse is 95. Her respiration is 18 and oxygen saturation is 97%.     Chief Complaint: URI (Sore throat, edema to tonsils (states EGD yesterday,) runny nose, sneezing. Symptoms started Sunday prior to procedure.)    25-year-old female reports to the clinic with complaints of sore throat, edema to her tonsils, rhinorrhea, sneezing.  Patient states that she had an EGD yesterday and states that symptoms began Sunday prior to her procedure but having an EGD worsened her symptoms.  Patient states that she has a history of a tonsillar abscess that she was hospitalized with 2 years ago and is concerned for tonsillitis and a tonsillar abscess today.  Patient states she has taken over-the-counter cold and cough medication with little relief and states that symptoms seem to be worse today.    All other systems are negative    Chart reviewed    Objective:   Physical Exam   Constitutional: She appears well-developed.  Non-toxic appearance. She does not appear ill. No distress.   HENT:   Head: Normocephalic and atraumatic.   Ears:   Right Ear: Hearing, tympanic membrane, external ear and ear canal normal.   Left Ear: Hearing, tympanic membrane, external ear and ear canal normal.   Nose: Mucosal edema and rhinorrhea present. No purulent discharge. Right sinus exhibits no maxillary sinus tenderness and no frontal sinus tenderness. Left sinus exhibits no maxillary sinus tenderness and no frontal sinus tenderness.   Mouth/Throat: Uvula is midline and mucous membranes are normal. Posterior oropharyngeal edema and posterior oropharyngeal erythema present. Tonsils are 3+ on the right. Tonsils are 1+ on the left. No tonsillar exudate.   Eyes: Right eye exhibits no discharge. Left eye exhibits no discharge. " Extraocular movement intact   Neck: Neck supple. No neck rigidity present.   Cardiovascular: Regular rhythm.   Pulmonary/Chest: Effort normal and breath sounds normal. No respiratory distress. She has no wheezes. She has no rhonchi. She has no rales.   Lymphadenopathy:     She has no cervical adenopathy.   Neurological: She is alert.   Skin: Skin is warm, dry and not diaphoretic.   Psychiatric: Her behavior is normal.   Nursing note and vitals reviewed.      Assessment:     1. Tonsillitis    2. Sore throat    3. Symptoms of upper respiratory infection (URI)        Results for orders placed or performed in visit on 07/16/25   POCT Strep A, Molecular    Collection Time: 07/16/25  9:35 AM   Result Value Ref Range    Molecular Strep A, POC Negative Negative     Acceptable Yes    SARS Coronavirus 2 Antigen, POCT Manual Read    Collection Time: 07/16/25  9:40 AM   Result Value Ref Range    SARS Coronavirus 2 Antigen Negative Negative, Presumptive Negative     Acceptable Yes         Plan:     Begin taking Augmentin and complete full course of medication  Begin using Chloraseptic throat spray every 2 hours as needed for throat pain  Discussed negative strep and COVID results with patient  Discussed over-the-counter remedies such as warm saltwater gargles, warm tea with honey, and nasal saline rinses  Please read all educational materials and discharge instructions carefully  Follow-up with primary care provider as instructed   Discussed signs and symptoms of worsening illness that would warrant further evaluation in the emergency department:  Patient verbalized understanding of these instructions     Tonsillitis  -     amoxicillin-clavulanate 875-125mg (AUGMENTIN) 875-125 mg per tablet; Take 1 tablet by mouth every 12 (twelve) hours. for 7 days  Dispense: 14 tablet; Refill: 0  -     phenoL (CHLORASEPTIC THROAT SPRAY) 1.4 % SprA; by Mucous Membrane route every 2 (two) hours. for 10 days   Dispense: 177 mL; Refill: 0    Sore throat  -     POCT Strep A, Molecular  -     SARS Coronavirus 2 Antigen, POCT Manual Read    Symptoms of upper respiratory infection (URI)  -     POCT Strep A, Molecular  -     SARS Coronavirus 2 Antigen, POCT Manual Read        Please note: This chart was completed via voice to text dictation. It may contain typographical/word recognition errors. If there are any questions, please contact the provider for final clarification.